# Patient Record
Sex: FEMALE | Race: WHITE | NOT HISPANIC OR LATINO | Employment: FULL TIME | URBAN - NONMETROPOLITAN AREA
[De-identification: names, ages, dates, MRNs, and addresses within clinical notes are randomized per-mention and may not be internally consistent; named-entity substitution may affect disease eponyms.]

---

## 2020-12-03 ENCOUNTER — HOSPITAL ENCOUNTER (OUTPATIENT)
Dept: ULTRASOUND IMAGING | Facility: HOSPITAL | Age: 35
Discharge: HOME | End: 2020-12-03

## 2020-12-03 ENCOUNTER — HOSPITAL ENCOUNTER (OUTPATIENT)
Dept: MAMMOGRAPHY | Facility: HOSPITAL | Age: 35
Discharge: HOME | End: 2020-12-03

## 2020-12-03 DIAGNOSIS — N63.0 BREAST LUMP: ICD-10-CM

## 2020-12-03 DIAGNOSIS — N63.0 LUMP OR MASS IN BREAST: ICD-10-CM

## 2020-12-03 PROCEDURE — 77066 DX MAMMO INCL CAD BI: CPT | Performed by: RADIOLOGY

## 2020-12-03 PROCEDURE — G0279 TOMOSYNTHESIS, MAMMO: HCPCS

## 2020-12-03 PROCEDURE — 76642 ULTRASOUND BREAST LIMITED: CPT | Performed by: RADIOLOGY

## 2020-12-03 PROCEDURE — 76642 ULTRASOUND BREAST LIMITED: CPT

## 2020-12-03 PROCEDURE — 77062 BREAST TOMOSYNTHESIS BI: CPT | Performed by: RADIOLOGY

## 2020-12-03 PROCEDURE — 77066 DX MAMMO INCL CAD BI: CPT

## 2021-01-08 ENCOUNTER — OFFICE VISIT (OUTPATIENT)
Dept: SURGERY | Facility: CLINIC | Age: 36
End: 2021-01-08

## 2021-01-08 ENCOUNTER — LAB (OUTPATIENT)
Dept: LAB | Facility: HOSPITAL | Age: 36
End: 2021-01-08

## 2021-01-08 ENCOUNTER — ANESTHESIA EVENT (OUTPATIENT)
Dept: PERIOP | Facility: HOSPITAL | Age: 36
End: 2021-01-08

## 2021-01-08 ENCOUNTER — HOSPITAL ENCOUNTER (OUTPATIENT)
Facility: HOSPITAL | Age: 36
Setting detail: HOSPITAL OUTPATIENT SURGERY
Discharge: HOME | End: 2021-01-08
Attending: SURGERY | Admitting: SURGERY

## 2021-01-08 ENCOUNTER — ANESTHESIA (OUTPATIENT)
Dept: PERIOP | Facility: HOSPITAL | Age: 36
End: 2021-01-08

## 2021-01-08 ENCOUNTER — PREP FOR SURGERY (OUTPATIENT)
Dept: OTHER | Facility: HOSPITAL | Age: 36
End: 2021-01-08

## 2021-01-08 VITALS
WEIGHT: 269 LBS | RESPIRATION RATE: 18 BRPM | TEMPERATURE: 97.7 F | BODY MASS INDEX: 36.44 KG/M2 | HEART RATE: 72 BPM | DIASTOLIC BLOOD PRESSURE: 79 MMHG | HEIGHT: 72 IN | OXYGEN SATURATION: 95 % | SYSTOLIC BLOOD PRESSURE: 125 MMHG

## 2021-01-08 VITALS
HEIGHT: 72 IN | BODY MASS INDEX: 37.6 KG/M2 | HEART RATE: 87 BPM | SYSTOLIC BLOOD PRESSURE: 139 MMHG | WEIGHT: 277.6 LBS | DIASTOLIC BLOOD PRESSURE: 90 MMHG

## 2021-01-08 DIAGNOSIS — Z01.818 PRE-OP TESTING: ICD-10-CM

## 2021-01-08 DIAGNOSIS — N61.1 BREAST ABSCESS: Primary | ICD-10-CM

## 2021-01-08 DIAGNOSIS — Z01.818 PRE-OP TESTING: Primary | ICD-10-CM

## 2021-01-08 DIAGNOSIS — N61.1 BREAST ABSCESS: ICD-10-CM

## 2021-01-08 LAB
B-HCG UR QL: NEGATIVE
GLUCOSE BLDC GLUCOMTR-MCNC: 125 MG/DL (ref 70–130)
INTERNAL NEGATIVE CONTROL: NEGATIVE
INTERNAL POSITIVE CONTROL: POSITIVE
Lab: NORMAL
SARS-COV-2 RDRP RESP QL NAA+PROBE: NORMAL

## 2021-01-08 PROCEDURE — 82962 GLUCOSE BLOOD TEST: CPT

## 2021-01-08 PROCEDURE — 25010000002 KETOROLAC TROMETHAMINE PER 15 MG: Performed by: NURSE ANESTHETIST, CERTIFIED REGISTERED

## 2021-01-08 PROCEDURE — 87070 CULTURE OTHR SPECIMN AEROBIC: CPT | Performed by: SURGERY

## 2021-01-08 PROCEDURE — 25010000003 MEPERIDINE PER 100 MG: Performed by: NURSE ANESTHETIST, CERTIFIED REGISTERED

## 2021-01-08 PROCEDURE — 10060 I&D ABSCESS SIMPLE/SINGLE: CPT | Performed by: SURGERY

## 2021-01-08 PROCEDURE — 25010000002 FENTANYL CITRATE (PF) 100 MCG/2ML SOLUTION: Performed by: NURSE ANESTHETIST, CERTIFIED REGISTERED

## 2021-01-08 PROCEDURE — 25010000002 PROPOFOL 10 MG/ML EMULSION: Performed by: NURSE ANESTHETIST, CERTIFIED REGISTERED

## 2021-01-08 PROCEDURE — 25010000002 MIDAZOLAM PER 1 MG: Performed by: NURSE ANESTHETIST, CERTIFIED REGISTERED

## 2021-01-08 PROCEDURE — 87635 SARS-COV-2 COVID-19 AMP PRB: CPT | Performed by: SURGERY

## 2021-01-08 PROCEDURE — 87205 SMEAR GRAM STAIN: CPT | Performed by: SURGERY

## 2021-01-08 PROCEDURE — 81025 URINE PREGNANCY TEST: CPT | Performed by: ANESTHESIOLOGY

## 2021-01-08 PROCEDURE — 87186 SC STD MICRODIL/AGAR DIL: CPT | Performed by: SURGERY

## 2021-01-08 PROCEDURE — 99243 OFF/OP CNSLTJ NEW/EST LOW 30: CPT | Performed by: SURGERY

## 2021-01-08 PROCEDURE — 25010000002 ONDANSETRON PER 1 MG: Performed by: NURSE ANESTHETIST, CERTIFIED REGISTERED

## 2021-01-08 RX ORDER — SODIUM CHLORIDE 0.9 % (FLUSH) 0.9 %
10 SYRINGE (ML) INJECTION EVERY 12 HOURS SCHEDULED
Status: DISCONTINUED | OUTPATIENT
Start: 2021-01-08 | End: 2021-01-08 | Stop reason: HOSPADM

## 2021-01-08 RX ORDER — MEPERIDINE HYDROCHLORIDE 25 MG/ML
12.5 INJECTION INTRAMUSCULAR; INTRAVENOUS; SUBCUTANEOUS
Status: COMPLETED | OUTPATIENT
Start: 2021-01-08 | End: 2021-01-08

## 2021-01-08 RX ORDER — FENTANYL CITRATE 50 UG/ML
50 INJECTION, SOLUTION INTRAMUSCULAR; INTRAVENOUS
Status: DISCONTINUED | OUTPATIENT
Start: 2021-01-08 | End: 2021-01-08 | Stop reason: HOSPADM

## 2021-01-08 RX ORDER — GLIMEPIRIDE 1 MG/1
TABLET ORAL
COMMUNITY
Start: 2020-10-30 | End: 2021-08-06

## 2021-01-08 RX ORDER — SODIUM CHLORIDE 0.9 % (FLUSH) 0.9 %
10 SYRINGE (ML) INJECTION AS NEEDED
Status: DISCONTINUED | OUTPATIENT
Start: 2021-01-08 | End: 2021-01-08 | Stop reason: HOSPADM

## 2021-01-08 RX ORDER — MULTIVIT,CALC,MINS/IRON/FOLIC 500-18-0.4
TABLET ORAL DAILY
COMMUNITY
Start: 2013-02-19

## 2021-01-08 RX ORDER — ONDANSETRON 2 MG/ML
INJECTION INTRAMUSCULAR; INTRAVENOUS AS NEEDED
Status: DISCONTINUED | OUTPATIENT
Start: 2021-01-08 | End: 2021-01-08 | Stop reason: SURG

## 2021-01-08 RX ORDER — MIDAZOLAM HYDROCHLORIDE 1 MG/ML
1 INJECTION INTRAMUSCULAR; INTRAVENOUS
Status: DISCONTINUED | OUTPATIENT
Start: 2021-01-08 | End: 2021-01-08 | Stop reason: HOSPADM

## 2021-01-08 RX ORDER — CLINDAMYCIN PHOSPHATE 900 MG/50ML
900 INJECTION INTRAVENOUS ONCE
Status: CANCELLED | OUTPATIENT
Start: 2021-01-14 | End: 2021-01-08

## 2021-01-08 RX ORDER — MIDAZOLAM HYDROCHLORIDE 1 MG/ML
INJECTION INTRAMUSCULAR; INTRAVENOUS AS NEEDED
Status: DISCONTINUED | OUTPATIENT
Start: 2021-01-08 | End: 2021-01-08 | Stop reason: SURG

## 2021-01-08 RX ORDER — ONDANSETRON 2 MG/ML
4 INJECTION INTRAMUSCULAR; INTRAVENOUS AS NEEDED
Status: DISCONTINUED | OUTPATIENT
Start: 2021-01-08 | End: 2021-01-08 | Stop reason: HOSPADM

## 2021-01-08 RX ORDER — SODIUM CHLORIDE, SODIUM LACTATE, POTASSIUM CHLORIDE, CALCIUM CHLORIDE 600; 310; 30; 20 MG/100ML; MG/100ML; MG/100ML; MG/100ML
100 INJECTION, SOLUTION INTRAVENOUS ONCE AS NEEDED
Status: DISCONTINUED | OUTPATIENT
Start: 2021-01-08 | End: 2021-01-08 | Stop reason: HOSPADM

## 2021-01-08 RX ORDER — FENTANYL CITRATE 50 UG/ML
INJECTION, SOLUTION INTRAMUSCULAR; INTRAVENOUS AS NEEDED
Status: DISCONTINUED | OUTPATIENT
Start: 2021-01-08 | End: 2021-01-08 | Stop reason: SURG

## 2021-01-08 RX ORDER — DROPERIDOL 2.5 MG/ML
0.62 INJECTION, SOLUTION INTRAMUSCULAR; INTRAVENOUS ONCE AS NEEDED
Status: DISCONTINUED | OUTPATIENT
Start: 2021-01-08 | End: 2021-01-08 | Stop reason: HOSPADM

## 2021-01-08 RX ORDER — KETOROLAC TROMETHAMINE 30 MG/ML
INJECTION, SOLUTION INTRAMUSCULAR; INTRAVENOUS AS NEEDED
Status: DISCONTINUED | OUTPATIENT
Start: 2021-01-08 | End: 2021-01-08 | Stop reason: SURG

## 2021-01-08 RX ORDER — SITAGLIPTIN AND METFORMIN HYDROCHLORIDE 1000; 100 MG/1; MG/1
1 TABLET, FILM COATED, EXTENDED RELEASE ORAL DAILY
COMMUNITY
End: 2021-08-06

## 2021-01-08 RX ORDER — SODIUM CHLORIDE, SODIUM LACTATE, POTASSIUM CHLORIDE, CALCIUM CHLORIDE 600; 310; 30; 20 MG/100ML; MG/100ML; MG/100ML; MG/100ML
125 INJECTION, SOLUTION INTRAVENOUS ONCE
Status: COMPLETED | OUTPATIENT
Start: 2021-01-08 | End: 2021-01-08

## 2021-01-08 RX ORDER — FAMOTIDINE 10 MG/ML
INJECTION, SOLUTION INTRAVENOUS AS NEEDED
Status: DISCONTINUED | OUTPATIENT
Start: 2021-01-08 | End: 2021-01-08 | Stop reason: SURG

## 2021-01-08 RX ORDER — OXYCODONE HYDROCHLORIDE AND ACETAMINOPHEN 5; 325 MG/1; MG/1
1 TABLET ORAL ONCE AS NEEDED
Status: DISCONTINUED | OUTPATIENT
Start: 2021-01-08 | End: 2021-01-08 | Stop reason: HOSPADM

## 2021-01-08 RX ORDER — MAGNESIUM HYDROXIDE 1200 MG/15ML
LIQUID ORAL AS NEEDED
Status: DISCONTINUED | OUTPATIENT
Start: 2021-01-08 | End: 2021-01-08 | Stop reason: HOSPADM

## 2021-01-08 RX ORDER — HYDROCODONE BITARTRATE AND ACETAMINOPHEN 7.5; 325 MG/1; MG/1
1 TABLET ORAL 4 TIMES DAILY PRN
Qty: 8 TABLET | Refills: 0 | Status: ON HOLD | OUTPATIENT
Start: 2021-01-08 | End: 2021-02-17 | Stop reason: SDUPTHER

## 2021-01-08 RX ORDER — PROPOFOL 10 MG/ML
VIAL (ML) INTRAVENOUS AS NEEDED
Status: DISCONTINUED | OUTPATIENT
Start: 2021-01-08 | End: 2021-01-08 | Stop reason: SURG

## 2021-01-08 RX ORDER — LIDOCAINE HYDROCHLORIDE 20 MG/ML
INJECTION, SOLUTION INFILTRATION; PERINEURAL AS NEEDED
Status: DISCONTINUED | OUTPATIENT
Start: 2021-01-08 | End: 2021-01-08 | Stop reason: SURG

## 2021-01-08 RX ORDER — IPRATROPIUM BROMIDE AND ALBUTEROL SULFATE 2.5; .5 MG/3ML; MG/3ML
3 SOLUTION RESPIRATORY (INHALATION) ONCE AS NEEDED
Status: DISCONTINUED | OUTPATIENT
Start: 2021-01-08 | End: 2021-01-08 | Stop reason: HOSPADM

## 2021-01-08 RX ORDER — DOXYCYCLINE HYCLATE 100 MG/1
100 CAPSULE ORAL 2 TIMES DAILY
Qty: 20 CAPSULE | Refills: 0 | Status: SHIPPED | OUTPATIENT
Start: 2021-01-08 | End: 2021-01-18

## 2021-01-08 RX ORDER — LORATADINE 10 MG/1
TABLET ORAL
COMMUNITY
Start: 2013-05-30

## 2021-01-08 RX ADMIN — MIDAZOLAM HYDROCHLORIDE 2 MG: 1 INJECTION, SOLUTION INTRAMUSCULAR; INTRAVENOUS at 13:08

## 2021-01-08 RX ADMIN — FENTANYL CITRATE 50 MCG: 50 INJECTION INTRAMUSCULAR; INTRAVENOUS at 13:11

## 2021-01-08 RX ADMIN — SODIUM CHLORIDE, POTASSIUM CHLORIDE, SODIUM LACTATE AND CALCIUM CHLORIDE: 600; 310; 30; 20 INJECTION, SOLUTION INTRAVENOUS at 13:02

## 2021-01-08 RX ADMIN — MEPERIDINE HYDROCHLORIDE 12.5 MG: 25 INJECTION INTRAMUSCULAR; INTRAVENOUS; SUBCUTANEOUS at 14:05

## 2021-01-08 RX ADMIN — PROPOFOL 150 MG: 10 INJECTION, EMULSION INTRAVENOUS at 13:11

## 2021-01-08 RX ADMIN — FENTANYL CITRATE 50 MCG: 50 INJECTION INTRAMUSCULAR; INTRAVENOUS at 13:58

## 2021-01-08 RX ADMIN — FENTANYL CITRATE 50 MCG: 50 INJECTION INTRAMUSCULAR; INTRAVENOUS at 13:53

## 2021-01-08 RX ADMIN — VANCOMYCIN HYDROCHLORIDE 1 G: 1 INJECTION, POWDER, LYOPHILIZED, FOR SOLUTION INTRAVENOUS at 13:18

## 2021-01-08 RX ADMIN — SODIUM CHLORIDE, POTASSIUM CHLORIDE, SODIUM LACTATE AND CALCIUM CHLORIDE: 600; 310; 30; 20 INJECTION, SOLUTION INTRAVENOUS at 13:24

## 2021-01-08 RX ADMIN — LIDOCAINE HYDROCHLORIDE 60 MG: 20 INJECTION, SOLUTION INFILTRATION; PERINEURAL at 13:11

## 2021-01-08 RX ADMIN — KETOROLAC TROMETHAMINE 30 MG: 30 INJECTION, SOLUTION INTRAMUSCULAR; INTRAVENOUS at 13:27

## 2021-01-08 RX ADMIN — MEPERIDINE HYDROCHLORIDE 12.5 MG: 25 INJECTION INTRAMUSCULAR; INTRAVENOUS; SUBCUTANEOUS at 13:56

## 2021-01-08 RX ADMIN — FAMOTIDINE 20 MG: 10 INJECTION INTRAVENOUS at 13:08

## 2021-01-08 RX ADMIN — ONDANSETRON 4 MG: 2 INJECTION INTRAMUSCULAR; INTRAVENOUS at 13:22

## 2021-01-08 RX ADMIN — FENTANYL CITRATE 50 MCG: 50 INJECTION INTRAMUSCULAR; INTRAVENOUS at 13:22

## 2021-01-08 NOTE — ANESTHESIA PREPROCEDURE EVALUATION
Anesthesia Evaluation     Patient summary reviewed and Nursing notes reviewed   no history of anesthetic complications:               Airway   Mallampati: II  TM distance: >3 FB  Neck ROM: full  No difficulty expected  Dental - normal exam     Pulmonary - negative pulmonary ROS and normal exam   Cardiovascular - negative cardio ROS and normal exam  Exercise tolerance: good (4-7 METS)    NYHA Classification: II        Neuro/Psych  (+) seizures well controlled,       ROS Comment: Siezures as child  GI/Hepatic/Renal/Endo    (+)   diabetes mellitus,     Musculoskeletal     Abdominal  - normal exam    Bowel sounds: normal.   Substance History - negative use     OB/GYN negative ob/gyn ROS         Other   arthritis,                      Anesthesia Plan    ASA 2     general     intravenous induction     Anesthetic plan, all risks, benefits, and alternatives have been provided, discussed and informed consent has been obtained with: patient.

## 2021-01-08 NOTE — PROGRESS NOTES
"Subjective   Marybeth Drake is a 35 y.o. female is being seen for consultation today at the request of Dr. Lopez for breat abscess.    History of Present Illness  Ms. Drake was seen in the office today for right breast abscess.  She states that this started back in November and was initially treated with antibiotics.  It subsequently flared up again and she saw Dr. Lopez who did an office drainage and left a drain in for about 2 weeks it was then removed.  Patient was treated with both Bactrim and doxycycline.  She does not know if a culture was done.  Just recently the abscess recurred and is extremely tender and painful.  The patient did have a bilateral diagnostic mammogram with tomosynthesis and right breast ultrasound on 12/3/2020 which did not demonstrate any undrained abscess and did not demonstrate malignancy.    Allergies   Allergen Reactions   • Phenobarbital Hallucinations       Current Outpatient Medications   Medication Sig Dispense Refill   • loratadine (CLARITIN) 10 MG tablet Take  by mouth.     • metFORMIN (GLUCOPHAGE) 850 MG tablet Take  by mouth.     • Multiple Vitamins-Calcium (One-A-Day Womens Formula) tablet Take  by mouth Daily.     • SITagliptin-metFORMIN HCl ER (Janumet XR) 100-1000 MG tablet Take 1 tablet by mouth Daily.     • dicloxacillin (DYNAPEN) 500 MG capsule TAKE 1 CAPSULE BY MOUTH EVERY 6 HOURS     • glimepiride (AMARYL) 1 MG tablet        No current facility-administered medications for this visit.      Past Medical History:   Diagnosis Date   • Arthritis    • Diabetes mellitus (CMS/HCC)    • Seizures (CMS/HCC)      Past Surgical History:   Procedure Laterality Date   • ARM HARDWARE REMOVAL     • SHOULDER SURGERY         Pertinent Review of Systems  Negative with the exception of history of present illness    Objective   /90 (BP Location: Left arm)   Pulse 87   Ht 185.4 cm (73\")   Wt 126 kg (277 lb 9.6 oz)   BMI 36.62 kg/m²    Physical Exam  Neck:  No supraclavicular " adenopathy  Lungs:  Respiratory effort normal. Auscultation: Clear, without wheezes, rhonchi, rales  Heart:  Regular rate and rhythm, without murmur, gallop, rub.  No pedal edema  Breasts: On visual inspection there is central erythema of the right breast with an obvious abscess just medial to the edge of the nipple areolar complex.  Examination of the right breast is limited due to the tenderness but the entire central area is indurated.  There is no axillary adenopathy.  Examination of the left breast demonstrates no discrete mass, skin change, or axillary adenopathy.       Procedures     Results/Data:  Imaging: Mammogram and ultrasound reports and images were reviewed and I agree with the assessment  Notes:   Lab:  Other:     Assessment/Plan   Recurrent right breast abscess    Plan: Patient will go to the OR today for surgical drainage       Discussion/Summary:    Time spent:     Patient's Body mass index is 36.62 kg/m². BMI is above normal parameters. Recommendations include: educational material.         Future Appointments   Date Time Provider Department Center   3/4/2021 10:00 AM [facility]         Please note that portions of this note were completed with a voice recognition program.

## 2021-01-08 NOTE — ANESTHESIA POSTPROCEDURE EVALUATION
Patient: Marybeth Drake    Procedure Summary     Date: 01/08/21 Room / Location:  [facility] OR 02 /  [facility]    Anesthesia Start: 1308 Anesthesia Stop: 1342    Procedure: INCISION AND DRAINAGE ABSCESS right breast (N/A Abdomen) Diagnosis:       Breast abscess      (Breast abscess [N61.1])    Surgeon: Mana Clark MD Provider: Fco Restrepo DO    Anesthesia Type: general ASA Status: 2          Anesthesia Type: general    Vitals  Vitals Value Taken Time   /80 01/08/21 1411   Temp 98.1 °F (36.7 °C) 01/08/21 1343   Pulse 83 01/08/21 1411   Resp 15 01/08/21 1411   SpO2 95 % 01/08/21 1411           Post Anesthesia Care and Evaluation    Patient location during evaluation: PHASE II  Patient participation: complete - patient participated  Level of consciousness: awake and alert  Pain score: 1  Pain management: adequate  Airway patency: patent  Anesthetic complications: No anesthetic complications  PONV Status: controlled  Cardiovascular status: acceptable  Respiratory status: acceptable  Hydration status: acceptable

## 2021-01-08 NOTE — H&P (VIEW-ONLY)
"Subjective   Marybeth Drake is a 35 y.o. female is being seen for consultation today at the request of Dr. Lopez for breat abscess.    History of Present Illness  Ms. Drake was seen in the office today for right breast abscess.  She states that this started back in November and was initially treated with antibiotics.  It subsequently flared up again and she saw Dr. Lopez who did an office drainage and left a drain in for about 2 weeks it was then removed.  Patient was treated with both Bactrim and doxycycline.  She does not know if a culture was done.  Just recently the abscess recurred and is extremely tender and painful.  The patient did have a bilateral diagnostic mammogram with tomosynthesis and right breast ultrasound on 12/3/2020 which did not demonstrate any undrained abscess and did not demonstrate malignancy.    Allergies   Allergen Reactions   • Phenobarbital Hallucinations       Current Outpatient Medications   Medication Sig Dispense Refill   • loratadine (CLARITIN) 10 MG tablet Take  by mouth.     • metFORMIN (GLUCOPHAGE) 850 MG tablet Take  by mouth.     • Multiple Vitamins-Calcium (One-A-Day Womens Formula) tablet Take  by mouth Daily.     • SITagliptin-metFORMIN HCl ER (Janumet XR) 100-1000 MG tablet Take 1 tablet by mouth Daily.     • dicloxacillin (DYNAPEN) 500 MG capsule TAKE 1 CAPSULE BY MOUTH EVERY 6 HOURS     • glimepiride (AMARYL) 1 MG tablet        No current facility-administered medications for this visit.      Past Medical History:   Diagnosis Date   • Arthritis    • Diabetes mellitus (CMS/HCC)    • Seizures (CMS/HCC)      Past Surgical History:   Procedure Laterality Date   • ARM HARDWARE REMOVAL     • SHOULDER SURGERY         Pertinent Review of Systems  Negative with the exception of history of present illness    Objective   /90 (BP Location: Left arm)   Pulse 87   Ht 185.4 cm (73\")   Wt 126 kg (277 lb 9.6 oz)   BMI 36.62 kg/m²    Physical Exam  Neck:  No supraclavicular " adenopathy  Lungs:  Respiratory effort normal. Auscultation: Clear, without wheezes, rhonchi, rales  Heart:  Regular rate and rhythm, without murmur, gallop, rub.  No pedal edema  Breasts: On visual inspection there is central erythema of the right breast with an obvious abscess just medial to the edge of the nipple areolar complex.  Examination of the right breast is limited due to the tenderness but the entire central area is indurated.  There is no axillary adenopathy.  Examination of the left breast demonstrates no discrete mass, skin change, or axillary adenopathy.       Procedures     Results/Data:  Imaging: Mammogram and ultrasound reports and images were reviewed and I agree with the assessment  Notes:   Lab:  Other:     Assessment/Plan   Recurrent right breast abscess    Plan: Patient will go to the OR today for surgical drainage       Discussion/Summary:    Time spent:     Patient's Body mass index is 36.62 kg/m². BMI is above normal parameters. Recommendations include: educational material.         Future Appointments   Date Time Provider Department Center   3/4/2021 10:00 AM [facility]         Please note that portions of this note were completed with a voice recognition program.  This document has been electronically signed by Mana CONNELL MD on January 8, 2021 11:30 EST

## 2021-01-08 NOTE — OP NOTE
Incision and Drainage  Right breast abscess    Surgeon:  Mana Clark M.D., ЕКАТЕРИНА    Assistant;  none    Pre-op:  Abscess right breast    Post-op:  Same    Anesthesia: Gen.    Indications: right breast abscess       Procedure Details   After obtaining informed consent and receiving preoperative antibiotics and with venous compression boots in place, the patient was taken to the operating room and placed under anesthesia.  Incision and drainage was performed and the wound was irrigated with peroxide.  Location:  Right medial breal  Findings:   5 cm abscess cavity  Culture:  done  Drain/Packing:  penrose      Estimated Blood Loss:  minimal    Blood administered:  None    Drains: see above    Grafts and Implants: None    Specimens:   ID Type Source Tests Collected by Time   1 (Not marked as sent) : right breast abscess  Wound Breast, Right WOUND CULTURE Mana Clark MD 1/8/2021 [id number]              Complications:  none           Disposition: PACU - hemodynamically stable.           Condition: stable

## 2021-01-08 NOTE — ANESTHESIA PROCEDURE NOTES
Airway  Urgency: elective    Date/Time: 1/8/2021 1:12 PM    General Information and Staff    Patient location during procedure: OR    Indications and Patient Condition    Preoxygenated: yes  Mask difficulty assessment: 0 - not attempted    Final Airway Details  Final airway type: supraglottic airway      Successful airway: unique  Size 4    Number of attempts at approach: 1  Assessment: lips, teeth, and gum same as pre-op

## 2021-01-11 LAB
BACTERIA SPEC AEROBE CULT: ABNORMAL
GRAM STN SPEC: ABNORMAL

## 2021-01-14 ENCOUNTER — OFFICE VISIT (OUTPATIENT)
Dept: SURGERY | Facility: CLINIC | Age: 36
End: 2021-01-14

## 2021-01-14 VITALS
HEART RATE: 90 BPM | BODY MASS INDEX: 36.44 KG/M2 | DIASTOLIC BLOOD PRESSURE: 85 MMHG | WEIGHT: 269 LBS | SYSTOLIC BLOOD PRESSURE: 134 MMHG | HEIGHT: 72 IN

## 2021-01-14 DIAGNOSIS — N61.1 BREAST ABSCESS: Primary | ICD-10-CM

## 2021-01-14 DIAGNOSIS — Z09 POSTOP CHECK: ICD-10-CM

## 2021-01-14 PROCEDURE — 99024 POSTOP FOLLOW-UP VISIT: CPT | Performed by: SURGERY

## 2021-01-14 NOTE — PROGRESS NOTES
"Subjective   Marybeth Drake is a 35 y.o. female is here today for post op.    History of Present Illness  Ms. Drake was seen in the office today for first postoperative visit following drainage of a right breast abscess.  Culture results grew light Streptococcus anginosis.  Patient is still on antibiotics.  Allergies   Allergen Reactions   • Phenobarbital Hallucinations         Current Outpatient Medications   Medication Sig Dispense Refill   • dicloxacillin (DYNAPEN) 500 MG capsule TAKE 1 CAPSULE BY MOUTH EVERY 6 HOURS     • doxycycline (VIBRAMYCIN) 100 MG capsule Take 1 capsule by mouth 2 (Two) Times a Day for 10 days. 20 capsule 0   • glimepiride (AMARYL) 1 MG tablet      • HYDROcodone-acetaminophen (Norco) 7.5-325 MG per tablet Take 1 tablet by mouth 4 (Four) Times a Day As Needed for Moderate Pain . 8 tablet 0   • loratadine (CLARITIN) 10 MG tablet Take  by mouth.     • metFORMIN (GLUCOPHAGE) 850 MG tablet Take  by mouth.     • Multiple Vitamins-Calcium (One-A-Day Womens Formula) tablet Take  by mouth Daily.     • SITagliptin-metFORMIN HCl ER (Janumet XR) 100-1000 MG tablet Take 1 tablet by mouth Daily.       No current facility-administered medications for this visit.        Objective   Ht 185.4 cm (73\")   Wt 122 kg (269 lb)   LMP 12/28/2020   BMI 35.49 kg/m²    Physical Exam  On examination of the right breast there is a Penrose drain in site.  Surgical incision is intact.  Induration is markedly improved.  Drain was removed but sutures were left in place  Results/Data  Culture results were reviewed    Procedures     Assessment/Plan   Right breast abscess growing Streptococcus    Follow-up in office Monday for suture removal       Discussion/Summary  Patient's Body mass index is 35.49 kg/m². BMI is above normal parameters. Recommendations include: educational material.       Future Appointments   Date Time Provider Department Center   3/4/2021 10:00 AM [facility]         Please note " that portions of this note were completed with a voice recognition program.

## 2021-01-18 ENCOUNTER — OFFICE VISIT (OUTPATIENT)
Dept: SURGERY | Facility: CLINIC | Age: 36
End: 2021-01-18

## 2021-01-18 VITALS
WEIGHT: 279 LBS | HEART RATE: 91 BPM | HEIGHT: 72 IN | BODY MASS INDEX: 37.79 KG/M2 | DIASTOLIC BLOOD PRESSURE: 88 MMHG | SYSTOLIC BLOOD PRESSURE: 121 MMHG

## 2021-01-18 DIAGNOSIS — N61.1 BREAST ABSCESS: Primary | ICD-10-CM

## 2021-01-18 DIAGNOSIS — Z51.89 VISIT FOR WOUND CHECK: ICD-10-CM

## 2021-01-18 PROCEDURE — 99024 POSTOP FOLLOW-UP VISIT: CPT | Performed by: SURGERY

## 2021-01-18 RX ORDER — CEFDINIR 300 MG/1
300 CAPSULE ORAL 2 TIMES DAILY
Qty: 14 CAPSULE | Refills: 0 | Status: SHIPPED | OUTPATIENT
Start: 2021-01-18 | End: 2021-01-25

## 2021-02-08 ENCOUNTER — OFFICE VISIT (OUTPATIENT)
Dept: SURGERY | Facility: CLINIC | Age: 36
End: 2021-02-08

## 2021-02-08 VITALS — BODY MASS INDEX: 38.14 KG/M2 | HEIGHT: 72 IN | WEIGHT: 281.6 LBS

## 2021-02-08 DIAGNOSIS — N61.1 BREAST ABSCESS: Primary | ICD-10-CM

## 2021-02-08 PROCEDURE — 87077 CULTURE AEROBIC IDENTIFY: CPT | Performed by: SURGERY

## 2021-02-08 PROCEDURE — 87070 CULTURE OTHR SPECIMN AEROBIC: CPT | Performed by: SURGERY

## 2021-02-08 PROCEDURE — 87205 SMEAR GRAM STAIN: CPT | Performed by: SURGERY

## 2021-02-08 PROCEDURE — 87186 SC STD MICRODIL/AGAR DIL: CPT | Performed by: SURGERY

## 2021-02-08 PROCEDURE — 99212 OFFICE O/P EST SF 10 MIN: CPT | Performed by: SURGERY

## 2021-02-08 RX ORDER — PENICILLIN V POTASSIUM 500 MG/1
500 TABLET ORAL 3 TIMES DAILY
Qty: 30 TABLET | Refills: 0 | Status: SHIPPED | OUTPATIENT
Start: 2021-02-08 | End: 2021-02-18

## 2021-02-08 NOTE — PROGRESS NOTES
Subjective   Marybeth Drake is a 35 y.o. female here today for wound check.    History of Present Illness  Ms. Drake was seen in the office today for recurrent right breast abscess.  She was initially seen in early January for recurrent abscess from December.  She underwent incision and drainage on 1/8/2021 which grew Streptococcus anginosus.  The patient healed up well from that but called the office earlier today stating that she felt that the abscess had recurred.  Patient also shared that she got her nipples repierced and she is having some drainage from the lateral aspect of the right nipple.  Allergies   Allergen Reactions   • Phenobarbital Hallucinations       Current Outpatient Medications   Medication Sig Dispense Refill   • glimepiride (AMARYL) 1 MG tablet      • loratadine (CLARITIN) 10 MG tablet Take  by mouth.     • metFORMIN (GLUCOPHAGE) 850 MG tablet Take  by mouth.     • Multiple Vitamins-Calcium (One-A-Day Womens Formula) tablet Take  by mouth Daily.     • SITagliptin-metFORMIN HCl ER (Janumet XR) 100-1000 MG tablet Take 1 tablet by mouth Daily.     • dicloxacillin (DYNAPEN) 500 MG capsule TAKE 1 CAPSULE BY MOUTH EVERY 6 HOURS     • HYDROcodone-acetaminophen (Norco) 7.5-325 MG per tablet Take 1 tablet by mouth 4 (Four) Times a Day As Needed for Moderate Pain . 8 tablet 0     No current facility-administered medications for this visit.      Past Medical History:   Diagnosis Date   • Abscess     right breast   • Arthritis    • Diabetes mellitus (CMS/HCC)    • Missed ab    • Seizures (CMS/HCC)      Past Surgical History:   Procedure Laterality Date   • ARM HARDWARE REMOVAL Right    • D&C WITH SUCTION     • INCISION AND DRAINAGE ABSCESS N/A 1/8/2021    Procedure: INCISION AND DRAINAGE ABSCESS right breast;  Surgeon: Mana Clark MD;  Location: [facility];  Service: General;  Laterality: N/A;   • SHOULDER SURGERY Right        Pertinent Review of Systems  Negative except for history of present  "illness    Objective   Ht 185.4 cm (73\")   Wt 128 kg (281 lb 9.6 oz)   BMI 37.15 kg/m²    Physical Exam  General:  This is a WD WN female in no acute distress  Lungs:  Respiratory effort normal. Auscultation: Clear, without wheezes, rhonchi, rales  Heart:  Regular rate and rhythm, without murmur, gallop, rub.  No pedal edema  Right breast: There is a transverse scar involving the 3:00 areola.  There is a single drop of pus that can be expressed from this.  On the lateral side of the nipple where the patient had the nipple pierced a drop of milky discharge could be elicited but this is not the same as what is coming out from the abscess site.  Culture was performed  Procedures     Results/Data:  Imaging:   Notes:   Lab: Prior culture results from the January drainage of abscess were reviewed    Other:     Assessment/Plan   Recurrent right breast abscess    Will empirically place patient on penicillin missed on prior culture results of strep  Tract culture result  Reexcision of abscess cavity in 1 week       Discussion/Summary:    Time spent:     Patient's Body mass index is 37.15 kg/m². BMI is above normal parameters. Recommendations include: educational material.         Future Appointments   Date Time Provider Department Center   3/4/2021 10:00 AM [facility]         Please note that portions of this note were completed with a voice recognition program.  "

## 2021-02-09 DIAGNOSIS — Z01.818 PRE-OP TESTING: Primary | ICD-10-CM

## 2021-02-10 NOTE — H&P (VIEW-ONLY)
Subjective   Marybeth Drake is a 35 y.o. female here today for wound check.    History of Present Illness  Ms. Drake was seen in the office today for recurrent right breast abscess.  She was initially seen in early January for recurrent abscess from December.  She underwent incision and drainage on 1/8/2021 which grew Streptococcus anginosus.  The patient healed up well from that but called the office earlier today stating that she felt that the abscess had recurred.  Patient also shared that she got her nipples repierced and she is having some drainage from the lateral aspect of the right nipple.  Allergies   Allergen Reactions   • Phenobarbital Hallucinations       Current Outpatient Medications   Medication Sig Dispense Refill   • glimepiride (AMARYL) 1 MG tablet      • loratadine (CLARITIN) 10 MG tablet Take  by mouth.     • metFORMIN (GLUCOPHAGE) 850 MG tablet Take  by mouth.     • Multiple Vitamins-Calcium (One-A-Day Womens Formula) tablet Take  by mouth Daily.     • SITagliptin-metFORMIN HCl ER (Janumet XR) 100-1000 MG tablet Take 1 tablet by mouth Daily.     • dicloxacillin (DYNAPEN) 500 MG capsule TAKE 1 CAPSULE BY MOUTH EVERY 6 HOURS     • HYDROcodone-acetaminophen (Norco) 7.5-325 MG per tablet Take 1 tablet by mouth 4 (Four) Times a Day As Needed for Moderate Pain . 8 tablet 0     No current facility-administered medications for this visit.      Past Medical History:   Diagnosis Date   • Abscess     right breast   • Arthritis    • Diabetes mellitus (CMS/HCC)    • Missed ab    • Seizures (CMS/HCC)      Past Surgical History:   Procedure Laterality Date   • ARM HARDWARE REMOVAL Right    • D&C WITH SUCTION     • INCISION AND DRAINAGE ABSCESS N/A 1/8/2021    Procedure: INCISION AND DRAINAGE ABSCESS right breast;  Surgeon: Mana Clark MD;  Location: [facility];  Service: General;  Laterality: N/A;   • SHOULDER SURGERY Right        Pertinent Review of Systems  Negative except for history of present  "illness    Objective   Ht 185.4 cm (73\")   Wt 128 kg (281 lb 9.6 oz)   BMI 37.15 kg/m²    Physical Exam  General:  This is a WD WN female in no acute distress  Lungs:  Respiratory effort normal. Auscultation: Clear, without wheezes, rhonchi, rales  Heart:  Regular rate and rhythm, without murmur, gallop, rub.  No pedal edema  Right breast: There is a transverse scar involving the 3:00 areola.  There is a single drop of pus that can be expressed from this.  On the lateral side of the nipple where the patient had the nipple pierced a drop of milky discharge could be elicited but this is not the same as what is coming out from the abscess site.  Culture was performed  Procedures     Results/Data:  Imaging:   Notes:   Lab: Prior culture results from the January drainage of abscess were reviewed    Other:     Assessment/Plan   Recurrent right breast abscess    Will empirically place patient on penicillin missed on prior culture results of strep  Tract culture result  Reexcision of abscess cavity in 1 week       Discussion/Summary:    Time spent:     Patient's Body mass index is 37.15 kg/m². BMI is above normal parameters. Recommendations include: educational material.         Future Appointments   Date Time Provider Department Center   3/4/2021 10:00 AM [facility]         Please note that portions of this note were completed with a voice recognition program.  This document has been electronically signed by Mana CONNELL MD on February 10, 2021 14:11 EST  "

## 2021-02-10 NOTE — H&P
Subjective   Marybeth Drake is a 35 y.o. female here today for wound check.    History of Present Illness  Ms. Drake was seen in the office today for recurrent right breast abscess.  She was initially seen in early January for recurrent abscess from December.  She underwent incision and drainage on 1/8/2021 which grew Streptococcus anginosus.  The patient healed up well from that but called the office earlier today stating that she felt that the abscess had recurred.  Patient also shared that she got her nipples repierced and she is having some drainage from the lateral aspect of the right nipple.  Allergies   Allergen Reactions   • Phenobarbital Hallucinations       Current Outpatient Medications   Medication Sig Dispense Refill   • glimepiride (AMARYL) 1 MG tablet      • loratadine (CLARITIN) 10 MG tablet Take  by mouth.     • metFORMIN (GLUCOPHAGE) 850 MG tablet Take  by mouth.     • Multiple Vitamins-Calcium (One-A-Day Womens Formula) tablet Take  by mouth Daily.     • SITagliptin-metFORMIN HCl ER (Janumet XR) 100-1000 MG tablet Take 1 tablet by mouth Daily.     • dicloxacillin (DYNAPEN) 500 MG capsule TAKE 1 CAPSULE BY MOUTH EVERY 6 HOURS     • HYDROcodone-acetaminophen (Norco) 7.5-325 MG per tablet Take 1 tablet by mouth 4 (Four) Times a Day As Needed for Moderate Pain . 8 tablet 0     No current facility-administered medications for this visit.      Past Medical History:   Diagnosis Date   • Abscess     right breast   • Arthritis    • Diabetes mellitus (CMS/HCC)    • Missed ab    • Seizures (CMS/HCC)      Past Surgical History:   Procedure Laterality Date   • ARM HARDWARE REMOVAL Right    • D&C WITH SUCTION     • INCISION AND DRAINAGE ABSCESS N/A 1/8/2021    Procedure: INCISION AND DRAINAGE ABSCESS right breast;  Surgeon: Mana Clark MD;  Location: [facility];  Service: General;  Laterality: N/A;   • SHOULDER SURGERY Right        Pertinent Review of Systems  Negative except for history of present  "illness    Objective   Ht 185.4 cm (73\")   Wt 128 kg (281 lb 9.6 oz)   BMI 37.15 kg/m²    Physical Exam  General:  This is a WD WN female in no acute distress  Lungs:  Respiratory effort normal. Auscultation: Clear, without wheezes, rhonchi, rales  Heart:  Regular rate and rhythm, without murmur, gallop, rub.  No pedal edema  Right breast: There is a transverse scar involving the 3:00 areola.  There is a single drop of pus that can be expressed from this.  On the lateral side of the nipple where the patient had the nipple pierced a drop of milky discharge could be elicited but this is not the same as what is coming out from the abscess site.  Culture was performed  Procedures     Results/Data:  Imaging:   Notes:   Lab: Prior culture results from the January drainage of abscess were reviewed    Other:     Assessment/Plan   Recurrent right breast abscess    Will empirically place patient on penicillin missed on prior culture results of strep  Tract culture result  Reexcision of abscess cavity in 1 week       Discussion/Summary:    Time spent:     Patient's Body mass index is 37.15 kg/m². BMI is above normal parameters. Recommendations include: educational material.         Future Appointments   Date Time Provider Department Center   3/4/2021 10:00 AM [facility]         Please note that portions of this note were completed with a voice recognition program.  This document has been electronically signed by Mana CONNELL MD on February 10, 2021 14:11 EST  "

## 2021-02-11 ENCOUNTER — TELEPHONE (OUTPATIENT)
Dept: SURGERY | Facility: CLINIC | Age: 36
End: 2021-02-11

## 2021-02-11 LAB
BACTERIA SPEC AEROBE CULT: ABNORMAL
GRAM STN SPEC: ABNORMAL

## 2021-02-12 ENCOUNTER — APPOINTMENT (OUTPATIENT)
Dept: PREADMISSION TESTING | Facility: HOSPITAL | Age: 36
End: 2021-02-12

## 2021-02-12 ENCOUNTER — LAB (OUTPATIENT)
Dept: LAB | Facility: HOSPITAL | Age: 36
End: 2021-02-12

## 2021-02-12 DIAGNOSIS — N61.1 BREAST ABSCESS: ICD-10-CM

## 2021-02-12 DIAGNOSIS — Z01.818 PRE-OP TESTING: ICD-10-CM

## 2021-02-12 LAB
ANION GAP SERPL CALCULATED.3IONS-SCNC: 12.1 MMOL/L (ref 5–15)
BASOPHILS # BLD AUTO: 0.1 10*3/MM3 (ref 0–0.2)
BASOPHILS NFR BLD AUTO: 0.8 % (ref 0–1.5)
BUN SERPL-MCNC: 10 MG/DL (ref 6–20)
BUN/CREAT SERPL: 13.5 (ref 7–25)
CALCIUM SPEC-SCNC: 9.7 MG/DL (ref 8.6–10.5)
CHLORIDE SERPL-SCNC: 103 MMOL/L (ref 98–107)
CO2 SERPL-SCNC: 25.9 MMOL/L (ref 22–29)
CREAT SERPL-MCNC: 0.74 MG/DL (ref 0.57–1)
DEPRECATED RDW RBC AUTO: 45 FL (ref 37–54)
EOSINOPHIL # BLD AUTO: 1.02 10*3/MM3 (ref 0–0.4)
EOSINOPHIL NFR BLD AUTO: 7.9 % (ref 0.3–6.2)
ERYTHROCYTE [DISTWIDTH] IN BLOOD BY AUTOMATED COUNT: 12.3 % (ref 12.3–15.4)
GFR SERPL CREATININE-BSD FRML MDRD: 88 ML/MIN/1.73
GLUCOSE SERPL-MCNC: 133 MG/DL (ref 65–99)
HCT VFR BLD AUTO: 47.1 % (ref 34–46.6)
HGB BLD-MCNC: 15.7 G/DL (ref 12–15.9)
IMM GRANULOCYTES # BLD AUTO: 0.04 10*3/MM3 (ref 0–0.05)
IMM GRANULOCYTES NFR BLD AUTO: 0.3 % (ref 0–0.5)
LYMPHOCYTES # BLD AUTO: 3.71 10*3/MM3 (ref 0.7–3.1)
LYMPHOCYTES NFR BLD AUTO: 28.8 % (ref 19.6–45.3)
MCH RBC QN AUTO: 32.9 PG (ref 26.6–33)
MCHC RBC AUTO-ENTMCNC: 33.3 G/DL (ref 31.5–35.7)
MCV RBC AUTO: 98.7 FL (ref 79–97)
MONOCYTES # BLD AUTO: 0.88 10*3/MM3 (ref 0.1–0.9)
MONOCYTES NFR BLD AUTO: 6.8 % (ref 5–12)
NEUTROPHILS NFR BLD AUTO: 55.4 % (ref 42.7–76)
NEUTROPHILS NFR BLD AUTO: 7.13 10*3/MM3 (ref 1.7–7)
NRBC BLD AUTO-RTO: 0 /100 WBC (ref 0–0.2)
PLATELET # BLD AUTO: 246 10*3/MM3 (ref 140–450)
PMV BLD AUTO: 10.8 FL (ref 6–12)
POTASSIUM SERPL-SCNC: 4.1 MMOL/L (ref 3.5–5.2)
RBC # BLD AUTO: 4.77 10*6/MM3 (ref 3.77–5.28)
SODIUM SERPL-SCNC: 141 MMOL/L (ref 136–145)
WBC # BLD AUTO: 12.88 10*3/MM3 (ref 3.4–10.8)

## 2021-02-12 PROCEDURE — 80048 BASIC METABOLIC PNL TOTAL CA: CPT

## 2021-02-12 PROCEDURE — C9803 HOPD COVID-19 SPEC COLLECT: HCPCS

## 2021-02-12 PROCEDURE — 36415 COLL VENOUS BLD VENIPUNCTURE: CPT

## 2021-02-12 PROCEDURE — U0004 COV-19 TEST NON-CDC HGH THRU: HCPCS

## 2021-02-12 PROCEDURE — 85025 COMPLETE CBC W/AUTO DIFF WBC: CPT

## 2021-02-12 NOTE — DISCHARGE INSTRUCTIONS
TAKE the following medications the morning of surgery:  2/16/2021  Arrival time per MD   All heart or blood pressure medications    HOLD all diabetic medications the morning of surgery as ordered by physician.    Please discontinue all blood thinners and anticoagulants (except aspirin) prior to surgery as per your surgeon and cardiologist instructions.  Aspirin may be continued up to the day prior to surgery.     CHLORHEXIDINE CLOTHS GIVEN WITH INSTRUCTIONS AND FORM TO RETURN TO HOSPITAL, IF APPLICABLE.    General Instructions:  2/15/2021  · Do not eat or drink after midnight: includes water, mints, or gum. You may brush your teeth.  Dental appliances that are removable must be taken out day of surgery.  · Do not smoke, chew tobacco, or drink alcohol.  · Bring medications in original bottles, any inhalers and if applicable your C-PAP/BI-PAP machine.  · Bring any papers given to you in the doctor's office.  · Wear clean comfortable clothes and socks.  · Do not wear contact lenses or make-up. Bring a case for your glasses if applicable.  · Bring crutches or walker if applicable.  · Leave all other valuables and jewelry at home.    If you were given a blood bank ID arm band remember to bring it with you the day of surgery.    Preventing a Surgical Site Infection:  Shower the night before surgery (unless instructed other wise) using a fresh bar of anti-bacterial soap (such as Dial) and clean washcloth. Dry with a clean towel and dress in clean clothing.  For 2 to 3 days before surgery, avoid shaving with a razor near where you will have surgery because the razor can irritate skin and make it easier to develop an infection. Ask your surgeon if you will be receiving antibiotics prior to surgery.  Make sure you, your family, and all healthcare providers clear their hands with soap and water or an alcohol-based hand  before caring for you or your wound.  If at all possible, quit smoking as many days before surgery  as you can.    Day of surgery:  Upon arrival, a Pre-op nurse and Anesthesiologist will review your health history, obtain vital signs, and answer questions you may have. The only belongings needed at this time will be your home medications and if applicable your C-PAP/BI-PAP machine. If you are staying overnight your family can leave the rest of your belongings in the car and bring them to your room later. A Pre-op nurse will start an IV and you may receive medication in preparation for surgery, including something to help you relax. Your family will be able to see you in the Pre-op area. While you are in surgery your family should notify the waiting room  if they leave the waiting room area and provide a contact phone number.    Please be aware that surgery does come with discomfort. We want to make every effort to control your discomfort so please discuss any uncontrolled symptoms with your nurse. Your doctor will most likely have prescribed pain medications.    If you are going home after surgery you will receive individualized written care instructions before being discharged. A responsible adult must drive you to and from the hospital on the day of surgery and stay with you for 24 hours.    If you are staying overnight following surgery, you will be transported to your hospital room following the recovery period.  [facility] has all private rooms.    If you have any questions please call Pre-Admission Testing at [phone].  Deductibles and co-payments are collected on the day of service. Please be prepared to pay the required co-pay, deductible or deposit on the day of service as defined by your plan.    A RESPONSIBLE PERSON MUST REMAIN IN THE WAITING ROOM DURING YOUR PROCEDURE AND A RESPONSIBLE  MUST BE AVAILABLE UPON YOUR DISCHARGE.     urinary symptoms

## 2021-02-13 LAB — SARS-COV-2 RNA RESP QL NAA+PROBE: NOT DETECTED

## 2021-02-17 ENCOUNTER — ANESTHESIA EVENT (OUTPATIENT)
Dept: PERIOP | Facility: HOSPITAL | Age: 36
End: 2021-02-17

## 2021-02-17 ENCOUNTER — ANESTHESIA (OUTPATIENT)
Dept: PERIOP | Facility: HOSPITAL | Age: 36
End: 2021-02-17

## 2021-02-17 ENCOUNTER — HOSPITAL ENCOUNTER (OUTPATIENT)
Facility: HOSPITAL | Age: 36
Setting detail: HOSPITAL OUTPATIENT SURGERY
Discharge: HOME | End: 2021-02-17
Attending: SURGERY | Admitting: SURGERY

## 2021-02-17 VITALS
BODY MASS INDEX: 38.08 KG/M2 | TEMPERATURE: 97.8 F | WEIGHT: 281.13 LBS | RESPIRATION RATE: 18 BRPM | HEIGHT: 72 IN | HEART RATE: 83 BPM | OXYGEN SATURATION: 98 % | DIASTOLIC BLOOD PRESSURE: 80 MMHG | SYSTOLIC BLOOD PRESSURE: 118 MMHG

## 2021-02-17 DIAGNOSIS — N61.1 BREAST ABSCESS: ICD-10-CM

## 2021-02-17 LAB
B-HCG UR QL: NEGATIVE
GLUCOSE BLDC GLUCOMTR-MCNC: 188 MG/DL (ref 70–130)
INTERNAL NEGATIVE CONTROL: NEGATIVE
INTERNAL POSITIVE CONTROL: POSITIVE
Lab: NORMAL

## 2021-02-17 PROCEDURE — 81025 URINE PREGNANCY TEST: CPT | Performed by: ANESTHESIOLOGY

## 2021-02-17 PROCEDURE — 25010000002 ONDANSETRON PER 1 MG: Performed by: NURSE ANESTHETIST, CERTIFIED REGISTERED

## 2021-02-17 PROCEDURE — 25010000002 PROPOFOL 10 MG/ML EMULSION: Performed by: NURSE ANESTHETIST, CERTIFIED REGISTERED

## 2021-02-17 PROCEDURE — 25010000002 MIDAZOLAM PER 1 MG: Performed by: NURSE ANESTHETIST, CERTIFIED REGISTERED

## 2021-02-17 PROCEDURE — 25010000002 FENTANYL CITRATE (PF) 100 MCG/2ML SOLUTION: Performed by: NURSE ANESTHETIST, CERTIFIED REGISTERED

## 2021-02-17 PROCEDURE — 82962 GLUCOSE BLOOD TEST: CPT

## 2021-02-17 PROCEDURE — 19120 REMOVAL OF BREAST LESION: CPT | Performed by: SURGERY

## 2021-02-17 RX ORDER — ONDANSETRON 2 MG/ML
INJECTION INTRAMUSCULAR; INTRAVENOUS AS NEEDED
Status: DISCONTINUED | OUTPATIENT
Start: 2021-02-17 | End: 2021-02-17 | Stop reason: SURG

## 2021-02-17 RX ORDER — MIDAZOLAM HYDROCHLORIDE 1 MG/ML
2 INJECTION INTRAMUSCULAR; INTRAVENOUS
Status: DISCONTINUED | OUTPATIENT
Start: 2021-02-17 | End: 2021-02-17 | Stop reason: HOSPADM

## 2021-02-17 RX ORDER — SODIUM CHLORIDE, SODIUM LACTATE, POTASSIUM CHLORIDE, CALCIUM CHLORIDE 600; 310; 30; 20 MG/100ML; MG/100ML; MG/100ML; MG/100ML
125 INJECTION, SOLUTION INTRAVENOUS ONCE
Status: COMPLETED | OUTPATIENT
Start: 2021-02-17 | End: 2021-02-17

## 2021-02-17 RX ORDER — MIDAZOLAM HYDROCHLORIDE 1 MG/ML
INJECTION INTRAMUSCULAR; INTRAVENOUS AS NEEDED
Status: DISCONTINUED | OUTPATIENT
Start: 2021-02-17 | End: 2021-02-17 | Stop reason: SURG

## 2021-02-17 RX ORDER — SODIUM CHLORIDE 0.9 % (FLUSH) 0.9 %
10 SYRINGE (ML) INJECTION EVERY 12 HOURS SCHEDULED
Status: DISCONTINUED | OUTPATIENT
Start: 2021-02-17 | End: 2021-02-17 | Stop reason: HOSPADM

## 2021-02-17 RX ORDER — PROPOFOL 10 MG/ML
VIAL (ML) INTRAVENOUS AS NEEDED
Status: DISCONTINUED | OUTPATIENT
Start: 2021-02-17 | End: 2021-02-17 | Stop reason: SURG

## 2021-02-17 RX ORDER — SODIUM CHLORIDE, SODIUM LACTATE, POTASSIUM CHLORIDE, CALCIUM CHLORIDE 600; 310; 30; 20 MG/100ML; MG/100ML; MG/100ML; MG/100ML
INJECTION, SOLUTION INTRAVENOUS CONTINUOUS PRN
Status: DISCONTINUED | OUTPATIENT
Start: 2021-02-17 | End: 2021-02-17 | Stop reason: SURG

## 2021-02-17 RX ORDER — HYDROCODONE BITARTRATE AND ACETAMINOPHEN 7.5; 325 MG/1; MG/1
1 TABLET ORAL 4 TIMES DAILY PRN
Qty: 8 TABLET | Refills: 0 | Status: SHIPPED | OUTPATIENT
Start: 2021-02-17 | End: 2021-08-06

## 2021-02-17 RX ORDER — MEPERIDINE HYDROCHLORIDE 25 MG/ML
12.5 INJECTION INTRAMUSCULAR; INTRAVENOUS; SUBCUTANEOUS
Status: DISCONTINUED | OUTPATIENT
Start: 2021-02-17 | End: 2021-02-17 | Stop reason: HOSPADM

## 2021-02-17 RX ORDER — OXYCODONE HYDROCHLORIDE AND ACETAMINOPHEN 5; 325 MG/1; MG/1
1 TABLET ORAL ONCE AS NEEDED
Status: COMPLETED | OUTPATIENT
Start: 2021-02-17 | End: 2021-02-17

## 2021-02-17 RX ORDER — FENTANYL CITRATE 50 UG/ML
INJECTION, SOLUTION INTRAMUSCULAR; INTRAVENOUS AS NEEDED
Status: DISCONTINUED | OUTPATIENT
Start: 2021-02-17 | End: 2021-02-17 | Stop reason: SURG

## 2021-02-17 RX ORDER — BUPIVACAINE HYDROCHLORIDE AND EPINEPHRINE 5; 5 MG/ML; UG/ML
INJECTION, SOLUTION EPIDURAL; INTRACAUDAL; PERINEURAL AS NEEDED
Status: DISCONTINUED | OUTPATIENT
Start: 2021-02-17 | End: 2021-02-17 | Stop reason: HOSPADM

## 2021-02-17 RX ORDER — CEFDINIR 300 MG/1
300 CAPSULE ORAL 2 TIMES DAILY
Qty: 14 CAPSULE | Refills: 0 | Status: SHIPPED | OUTPATIENT
Start: 2021-02-17 | End: 2021-02-24

## 2021-02-17 RX ORDER — MAGNESIUM HYDROXIDE 1200 MG/15ML
LIQUID ORAL AS NEEDED
Status: DISCONTINUED | OUTPATIENT
Start: 2021-02-17 | End: 2021-02-17 | Stop reason: HOSPADM

## 2021-02-17 RX ORDER — FAMOTIDINE 10 MG/ML
INJECTION, SOLUTION INTRAVENOUS AS NEEDED
Status: DISCONTINUED | OUTPATIENT
Start: 2021-02-17 | End: 2021-02-17 | Stop reason: SURG

## 2021-02-17 RX ORDER — FENTANYL CITRATE 50 UG/ML
50 INJECTION, SOLUTION INTRAMUSCULAR; INTRAVENOUS
Status: DISCONTINUED | OUTPATIENT
Start: 2021-02-17 | End: 2021-02-17 | Stop reason: HOSPADM

## 2021-02-17 RX ORDER — CLINDAMYCIN PHOSPHATE 900 MG/50ML
900 INJECTION INTRAVENOUS ONCE
Status: COMPLETED | OUTPATIENT
Start: 2021-02-17 | End: 2021-02-17

## 2021-02-17 RX ORDER — SODIUM CHLORIDE 0.9 % (FLUSH) 0.9 %
10 SYRINGE (ML) INJECTION AS NEEDED
Status: DISCONTINUED | OUTPATIENT
Start: 2021-02-17 | End: 2021-02-17 | Stop reason: HOSPADM

## 2021-02-17 RX ORDER — MIDAZOLAM HYDROCHLORIDE 1 MG/ML
1 INJECTION INTRAMUSCULAR; INTRAVENOUS
Status: DISCONTINUED | OUTPATIENT
Start: 2021-02-17 | End: 2021-02-17 | Stop reason: HOSPADM

## 2021-02-17 RX ORDER — LIDOCAINE HYDROCHLORIDE 20 MG/ML
INJECTION, SOLUTION INFILTRATION; PERINEURAL AS NEEDED
Status: DISCONTINUED | OUTPATIENT
Start: 2021-02-17 | End: 2021-02-17 | Stop reason: SURG

## 2021-02-17 RX ORDER — ONDANSETRON 2 MG/ML
4 INJECTION INTRAMUSCULAR; INTRAVENOUS AS NEEDED
Status: DISCONTINUED | OUTPATIENT
Start: 2021-02-17 | End: 2021-02-17 | Stop reason: HOSPADM

## 2021-02-17 RX ORDER — IPRATROPIUM BROMIDE AND ALBUTEROL SULFATE 2.5; .5 MG/3ML; MG/3ML
3 SOLUTION RESPIRATORY (INHALATION) ONCE AS NEEDED
Status: DISCONTINUED | OUTPATIENT
Start: 2021-02-17 | End: 2021-02-17 | Stop reason: HOSPADM

## 2021-02-17 RX ADMIN — ONDANSETRON 4 MG: 2 INJECTION INTRAMUSCULAR; INTRAVENOUS at 11:08

## 2021-02-17 RX ADMIN — FENTANYL CITRATE 50 MCG: 50 INJECTION INTRAMUSCULAR; INTRAVENOUS at 10:48

## 2021-02-17 RX ADMIN — CLINDAMYCIN PHOSPHATE 900 MG: 900 INJECTION, SOLUTION INTRAVENOUS at 10:48

## 2021-02-17 RX ADMIN — FENTANYL CITRATE 50 MCG: 50 INJECTION INTRAMUSCULAR; INTRAVENOUS at 11:50

## 2021-02-17 RX ADMIN — FAMOTIDINE 20 MG: 10 INJECTION INTRAVENOUS at 11:08

## 2021-02-17 RX ADMIN — FENTANYL CITRATE 50 MCG: 50 INJECTION INTRAMUSCULAR; INTRAVENOUS at 11:35

## 2021-02-17 RX ADMIN — SODIUM CHLORIDE, POTASSIUM CHLORIDE, SODIUM LACTATE AND CALCIUM CHLORIDE: 600; 310; 30; 20 INJECTION, SOLUTION INTRAVENOUS at 10:48

## 2021-02-17 RX ADMIN — LIDOCAINE HYDROCHLORIDE 60 MG: 20 INJECTION, SOLUTION INFILTRATION; PERINEURAL at 10:52

## 2021-02-17 RX ADMIN — SODIUM CHLORIDE, POTASSIUM CHLORIDE, SODIUM LACTATE AND CALCIUM CHLORIDE 125 ML/HR: 600; 310; 30; 20 INJECTION, SOLUTION INTRAVENOUS at 10:11

## 2021-02-17 RX ADMIN — MIDAZOLAM 2 MG: 1 INJECTION INTRAMUSCULAR; INTRAVENOUS at 10:48

## 2021-02-17 RX ADMIN — PROPOFOL 200 MG: 10 INJECTION, EMULSION INTRAVENOUS at 10:52

## 2021-02-17 RX ADMIN — OXYCODONE HYDROCHLORIDE AND ACETAMINOPHEN 1 TABLET: 5; 325 TABLET ORAL at 11:52

## 2021-02-17 NOTE — ANESTHESIA PREPROCEDURE EVALUATION
Anesthesia Evaluation     Patient summary reviewed and Nursing notes reviewed   no history of anesthetic complications:  NPO Solid Status: > 8 hours  NPO Liquid Status: > 8 hours           Airway   Mallampati: II  TM distance: >3 FB  Neck ROM: full  No difficulty expected  Dental - normal exam         Pulmonary - normal exam    breath sounds clear to auscultation  (+) a smoker Current Smoked day of surgery,   Cardiovascular - negative cardio ROS and normal exam    Rhythm: regular        Neuro/Psych  (+) seizures,     GI/Hepatic/Renal/Endo    (+) obesity, morbid obesity,  diabetes mellitus type 2 poorly controlled,     Musculoskeletal     Abdominal  - normal exam   Substance History - negative use     OB/GYN negative ob/gyn ROS   (-)  Pregnant        Other   arthritis,          Phys Exam Other: Cap No. 9                Anesthesia Plan    ASA 2     general     intravenous induction     Anesthetic plan, all risks, benefits, and alternatives have been provided, discussed and informed consent has been obtained with: patient.    Plan discussed with CRNA.

## 2021-02-17 NOTE — ANESTHESIA POSTPROCEDURE EVALUATION
Patient: Marybeth Drake    Procedure Summary     Date: 02/17/21 Room / Location:  [facility] OR 02 /  [facility]    Anesthesia Start: 1048 Anesthesia Stop: 1127    Procedure: EXCISION RIGHT BREAST ABSCESS (Right Breast) Diagnosis:       Breast abscess      (Breast abscess [N61.1])    Surgeon: Mana Clark MD Provider: Charli Saenz DO    Anesthesia Type: general ASA Status: 2          Anesthesia Type: general    Vitals  Vitals Value Taken Time   /73 02/17/21 1200   Temp 97.8 °F (36.6 °C) 02/17/21 1130   Pulse 81 02/17/21 1200   Resp 11 02/17/21 1200   SpO2 98 % 02/17/21 1200           Post Anesthesia Care and Evaluation    Patient location during evaluation: PHASE II  Patient participation: complete - patient participated  Level of consciousness: awake and alert  Pain score: 1  Pain management: adequate  Airway patency: patent  Anesthetic complications: No anesthetic complications  PONV Status: controlled  Cardiovascular status: acceptable  Respiratory status: acceptable  Hydration status: euvolemic  No anesthesia care post op

## 2021-02-17 NOTE — OP NOTE
Excision recurrent right breast abscess    Pre-op:  Recurrent right breast abscess    Post-op:  Same    Surgeon:  Mana Clark M.D., F.A.C.S.    Assistant:  Kenneth Santana    Anesthesia:  general      Indications: Recurrent right breast abscess       Procedure Details   After obtaining informed consent and receiving preoperative antibiotics and with venous compression boots in place, the patient was taken to the operating room and placed under anesthesia.  The right breast was prepped and draped in a sterile fashion.  There was a small opening at the medial aspect of the previous scar.  The lacrimal probe was used to access the tract which went from medial to lateral, towards the nipple.  An elliptical incision excising this opening as well as the previous scar was made from the base of the nipple to the opening of the drainage.  Area was excised down to normal-appearing tissue.  No abscess was identified.  After irrigation and obtaining hemostasis Marcaine with epinephrine was injected for postoperative analgesia.  The skin was closed with interrupted 4-0 nylon simple and vertical mattress sutures.  Incision length was 3.7 cm.    Findings:    Estimated Blood Loss:  Minimal    Blood Administered: none           Drains: none           Specimens:   ID Type Source Tests Collected by Time   A : right breast biospy Tissue Breast, Right TISSUE PATHOLOGY EXAM Mana Clark MD 2/17/2021 1121        Grafts and Implants: No       Complications:  none           Disposition: PACU - hemodynamically stable.           Condition: stable

## 2021-02-17 NOTE — ANESTHESIA PROCEDURE NOTES
Airway  Urgency: elective    Date/Time: 2/17/2021 10:57 AM  End Time:2/17/2021 10:59 AM  Airway not difficult    General Information and Staff    Patient location during procedure: OR  Anesthesiologist: Charli Saenz DO  CRNA: Ruthann Stauffer CRNA    Indications and Patient Condition  Indications for airway management: airway protection    Preoxygenated: yes  MILS maintained throughout  Mask difficulty assessment: 1 - vent by mask    Final Airway Details  Final airway type: supraglottic airway      Successful airway: classic  Size 4    Number of attempts at approach: 1  Assessment: lips, teeth, and gum same as pre-op and atraumatic intubation    Additional Comments  AOI X 1 PASS +BBS, +ETCO2, +R//F/C MOUTH TEETH GUMS SAME AS PREOP

## 2021-02-19 LAB
LAB AP CASE REPORT: NORMAL
PATH REPORT.FINAL DX SPEC: NORMAL

## 2021-02-25 ENCOUNTER — OFFICE VISIT (OUTPATIENT)
Dept: SURGERY | Facility: CLINIC | Age: 36
End: 2021-02-25

## 2021-02-25 VITALS
BODY MASS INDEX: 37.95 KG/M2 | WEIGHT: 280.2 LBS | HEIGHT: 72 IN | HEART RATE: 92 BPM | SYSTOLIC BLOOD PRESSURE: 127 MMHG | DIASTOLIC BLOOD PRESSURE: 95 MMHG

## 2021-02-25 DIAGNOSIS — Z51.89 VISIT FOR WOUND CHECK: ICD-10-CM

## 2021-02-25 DIAGNOSIS — N61.1 BREAST ABSCESS: Primary | ICD-10-CM

## 2021-02-25 PROCEDURE — 99024 POSTOP FOLLOW-UP VISIT: CPT | Performed by: SURGERY

## 2021-02-25 RX ORDER — FLUCONAZOLE 150 MG/1
150 TABLET ORAL ONCE
Qty: 3 TABLET | Refills: 0 | Status: SHIPPED | OUTPATIENT
Start: 2021-02-25 | End: 2021-02-25

## 2021-02-25 NOTE — PROGRESS NOTES
"Subjective   Marybeth Drake is a 36 y.o. female here today for post op.    History of Present Illness  Ms. Drake was seen in the office today for first postoperative visit following reexcision of a recurrent right subareolar abscess.  Patient states that one of the stitches broke free and the area has opened up a little bit.  She is still on antibiotics.  Allergies   Allergen Reactions   • Phenobarbital Hallucinations         Current Outpatient Medications   Medication Sig Dispense Refill   • glimepiride (AMARYL) 1 MG tablet      • loratadine (CLARITIN) 10 MG tablet Take  by mouth.     • metFORMIN (GLUCOPHAGE) 850 MG tablet Take  by mouth.     • Multiple Vitamins-Calcium (One-A-Day Womens Formula) tablet Take  by mouth Daily.     • SITagliptin-metFORMIN HCl ER (Janumet XR) 100-1000 MG tablet Take 1 tablet by mouth Daily.     • HYDROcodone-acetaminophen (Norco) 7.5-325 MG per tablet Take 1 tablet by mouth 4 (Four) Times a Day As Needed for Moderate Pain . 8 tablet 0     No current facility-administered medications for this visit.        Objective   Ht 185.4 cm (73\")   Wt 127 kg (280 lb 3.2 oz)   LMP  (LMP Unknown)   BMI 36.97 kg/m²    Physical Exam  This is a well-developed well-nourished female in no acute distress  Right breast: There is a radial scar starting at the nipple 3:00 and extending medially.  The most medial aspect has opened up by about 3 x 2 mm.  The remainder of the incision is clean.  3 of the other sutures were removed.  There was an additional area where the skin appeared to open up a little bit and therefore the last suture was left in place.  Results/Data  Pathology report was reviewed and discussed with the patient    Procedures     Assessment/Plan   Status post reexcision of a right breast abscess    Follow-up in 10 days for wound recheck and to remove the last suture       Discussion/Summary  Patient's Body mass index is 36.97 kg/m². BMI is above normal parameters. Recommendations include: " educational material.       Future Appointments   Date Time Provider Department Center   2/25/2021  9:55 AM Mana Clark MD [facility] [place] [facility]   3/4/2021 10:00 AM [facility]  [facility]         Please note that portions of this note were completed with a voice recognition program.

## 2021-03-04 ENCOUNTER — HOSPITAL ENCOUNTER (OUTPATIENT)
Dept: MAMMOGRAPHY | Facility: HOSPITAL | Age: 36
Discharge: HOME | End: 2021-03-04
Admitting: ADVANCED PRACTICE MIDWIFE

## 2021-03-04 DIAGNOSIS — R92.8 ABNORMAL MAMMOGRAM: ICD-10-CM

## 2021-03-04 PROCEDURE — G0279 TOMOSYNTHESIS, MAMMO: HCPCS

## 2021-03-04 PROCEDURE — 77066 DX MAMMO INCL CAD BI: CPT

## 2021-03-04 PROCEDURE — 77066 DX MAMMO INCL CAD BI: CPT | Performed by: RADIOLOGY

## 2021-03-04 PROCEDURE — 77062 BREAST TOMOSYNTHESIS BI: CPT | Performed by: RADIOLOGY

## 2021-03-08 ENCOUNTER — OFFICE VISIT (OUTPATIENT)
Dept: SURGERY | Facility: CLINIC | Age: 36
End: 2021-03-08

## 2021-03-08 VITALS
SYSTOLIC BLOOD PRESSURE: 119 MMHG | BODY MASS INDEX: 37.68 KG/M2 | WEIGHT: 278.2 LBS | HEART RATE: 90 BPM | HEIGHT: 72 IN | DIASTOLIC BLOOD PRESSURE: 85 MMHG

## 2021-03-08 DIAGNOSIS — N61.1 BREAST ABSCESS: Primary | ICD-10-CM

## 2021-03-08 DIAGNOSIS — Z51.89 VISIT FOR WOUND CHECK: ICD-10-CM

## 2021-03-08 PROCEDURE — 99024 POSTOP FOLLOW-UP VISIT: CPT | Performed by: SURGERY

## 2021-03-08 NOTE — PROGRESS NOTES
"Subjective   Marybeth Drake is a 36 y.o. female here today for post op.    History of Present Illness  Ms. Drake was seen in the office today for her second postoperative visit following reexcision of a recurrent right subareolar abscess.    Most of the sutures were removed at the first postoperative visit but she presents to have the rest of her sutures removed.  She has completed her course of antibiotics.  Allergies   Allergen Reactions   • Phenobarbital Hallucinations         Current Outpatient Medications   Medication Sig Dispense Refill   • glimepiride (AMARYL) 1 MG tablet      • loratadine (CLARITIN) 10 MG tablet Take  by mouth.     • metFORMIN (GLUCOPHAGE) 850 MG tablet Take  by mouth.     • Multiple Vitamins-Calcium (One-A-Day Womens Formula) tablet Take  by mouth Daily.     • SITagliptin-metFORMIN HCl ER (Janumet XR) 100-1000 MG tablet Take 1 tablet by mouth Daily.     • HYDROcodone-acetaminophen (Norco) 7.5-325 MG per tablet Take 1 tablet by mouth 4 (Four) Times a Day As Needed for Moderate Pain . 8 tablet 0     No current facility-administered medications for this visit.       Objective   Ht 185.4 cm (73\")   Wt 126 kg (278 lb 3.2 oz)   LMP  (LMP Unknown)   BMI 36.70 kg/m²    Physical Exam  Right breast: On examination of the right nipple areolar area there is still some induration.  There is no purulent drainage or cellulitis.  There is no fluctuance.  Remaining sutures were removed without complication.  Results/Data  Pathology report was reviewed and did demonstrate residual abscess    Procedures     Assessment/Plan   Status post reexcision of right breast abscess    Follow-up as needed       Discussion/Summary  Patient's Body mass index is 36.7 kg/m². BMI is above normal parameters. Recommendations include: educational material.       No future appointments.      Please note that portions of this note were completed with a voice recognition program.  "

## 2021-03-16 ENCOUNTER — BULK ORDERING (OUTPATIENT)
Dept: CASE MANAGEMENT | Facility: OTHER | Age: 36
End: 2021-03-16

## 2021-03-16 DIAGNOSIS — Z23 IMMUNIZATION DUE: ICD-10-CM

## 2021-04-12 ENCOUNTER — OFFICE VISIT (OUTPATIENT)
Dept: SURGERY | Facility: CLINIC | Age: 36
End: 2021-04-12

## 2021-04-12 VITALS
DIASTOLIC BLOOD PRESSURE: 92 MMHG | SYSTOLIC BLOOD PRESSURE: 136 MMHG | HEIGHT: 72 IN | BODY MASS INDEX: 38.14 KG/M2 | HEART RATE: 85 BPM | WEIGHT: 281.6 LBS

## 2021-04-12 DIAGNOSIS — L24.A9 WOUND DRAINAGE: Primary | ICD-10-CM

## 2021-04-12 PROCEDURE — 99024 POSTOP FOLLOW-UP VISIT: CPT | Performed by: SURGERY

## 2021-04-12 NOTE — PROGRESS NOTES
Subjective   Marybeth Drake is a 36 y.o. female here today because old wound has re-opened.    History of Present Illness  Ms. Drake was seen in the office today for breast follow-up.  She was last seen in the office on 3/8/2021 following an excision of a recurrent right breast abscess on 2/17/2021.  Patient states that recently she had some soreness in the area then a small amount of drainage for 2 days.  It then resolved on its own and the patient has not had any further drainage.  She was not on antibiotics.  Previous wound culture grew strep  Allergies   Allergen Reactions   • Phenobarbital Hallucinations       Current Outpatient Medications   Medication Sig Dispense Refill   • glimepiride (AMARYL) 1 MG tablet      • loratadine (CLARITIN) 10 MG tablet Take  by mouth.     • metFORMIN (GLUCOPHAGE) 850 MG tablet Take  by mouth.     • Multiple Vitamins-Calcium (One-A-Day Womens Formula) tablet Take  by mouth Daily.     • SITagliptin-metFORMIN HCl ER (Janumet XR) 100-1000 MG tablet Take 1 tablet by mouth Daily.     • HYDROcodone-acetaminophen (Norco) 7.5-325 MG per tablet Take 1 tablet by mouth 4 (Four) Times a Day As Needed for Moderate Pain . 8 tablet 0     No current facility-administered medications for this visit.     Past Medical History:   Diagnosis Date   • Abscess     right breast   • Alopecia areata    • Arthritis    • Diabetes mellitus (CMS/HCC)    • Missed ab    • MRSA (methicillin resistant staph aureus) culture positive    • Seizures (CMS/HCC)      Past Surgical History:   Procedure Laterality Date   • ARM HARDWARE REMOVAL Right    • BREAST BIOPSY Right 2/17/2021    Procedure: EXCISION RIGHT BREAST ABSCESS;  Surgeon: Mana Clark MD;  Location: [facility] OR;  Service: General;  Laterality: Right;   • D & C WITH SUCTION     • INCISION AND DRAINAGE ABSCESS N/A 1/8/2021    Procedure: INCISION AND DRAINAGE ABSCESS right breast;  Surgeon: Mana Clark MD;  Location: [facility] OR;  Service: General;   "Laterality: N/A;   • SHOULDER SURGERY Right        Pertinent Review of Systems      Objective   /92 (BP Location: Left arm)   Pulse 85   Ht 185.4 cm (73\")   Wt 128 kg (281 lb 9.6 oz)   BMI 37.15 kg/m²    Physical Exam  Right breast: On examination of the breast there is a scar in the 6 o'clock position.  There is some mild induration around this.  There is no cellulitis.  There is no current drainage there is a very small opening where the patient recently had her reported drainage.  There is no evidence of abscess.  Procedures     Results/Data:  Imaging:   Notes:   Lab: Prior culture result was reviewed.  Other:     Assessment/Plan   Wound drainage at the site of recurrent breast abscess.    Plan: As the drainage resolved quickly without antibiotics would give patient an additional trial of antibiotics before recommending reexcision.  Given the previous culture of strep, Dalvance would be a good choice.  Patient will be scheduled to receive a dose of this from the infusion clinic and then follow-up as needed.       Discussion/Summary:    Time spent:     Patient's Body mass index is 37.15 kg/m². BMI is above normal parameters. Recommendations include: educational material.         No future appointments.      Please note that portions of this note were completed with a voice recognition program.  "

## 2021-04-13 DIAGNOSIS — N61.1 BREAST ABSCESS: Primary | ICD-10-CM

## 2021-04-15 ENCOUNTER — HOSPITAL ENCOUNTER (OUTPATIENT)
Dept: INFUSION THERAPY | Facility: HOSPITAL | Age: 36
Discharge: HOME | End: 2021-04-15
Admitting: SURGERY

## 2021-04-15 VITALS
SYSTOLIC BLOOD PRESSURE: 120 MMHG | TEMPERATURE: 98.5 F | DIASTOLIC BLOOD PRESSURE: 74 MMHG | RESPIRATION RATE: 18 BRPM | HEART RATE: 79 BPM

## 2021-04-15 DIAGNOSIS — N61.1 BREAST ABSCESS: Primary | ICD-10-CM

## 2021-04-15 LAB — GLUCOSE BLDC GLUCOMTR-MCNC: 227 MG/DL (ref 70–130)

## 2021-04-15 PROCEDURE — 96365 THER/PROPH/DIAG IV INF INIT: CPT

## 2021-04-15 PROCEDURE — 82962 GLUCOSE BLOOD TEST: CPT

## 2021-04-15 PROCEDURE — 25010000002 DALBAVANCIN 500 MG RECONSTITUTED SOLUTION 1 EACH VIAL: Performed by: SURGERY

## 2021-04-15 RX ORDER — DEXTROSE MONOHYDRATE 50 MG/ML
50 INJECTION, SOLUTION INTRAVENOUS CONTINUOUS
Status: DISCONTINUED | OUTPATIENT
Start: 2021-04-15 | End: 2021-04-17 | Stop reason: HOSPADM

## 2021-04-15 RX ORDER — DEXTROSE MONOHYDRATE 50 MG/ML
50 INJECTION, SOLUTION INTRAVENOUS CONTINUOUS
Status: CANCELLED
Start: 2021-04-15

## 2021-04-15 RX ADMIN — DALBAVANCIN 1500 MG: 500 INJECTION, POWDER, FOR SOLUTION INTRAVENOUS at 13:43

## 2021-04-15 RX ADMIN — DEXTROSE MONOHYDRATE 50 ML: 5 INJECTION, SOLUTION INTRAVENOUS at 13:43

## 2021-04-15 NOTE — PATIENT INSTRUCTIONS
Dalbavancin injection  What is this medicine?  DALBAVANCIN (IMER ba van sin) is an antibiotic. It is used to treat certain kinds of bacterial infections. It will not work for colds, flu, or other viral infections.  This medicine may be used for other purposes; ask your health care provider or pharmacist if you have questions.  COMMON BRAND NAME(S): DALVANCE  What should I tell my health care provider before I take this medicine?  They need to know if you have any of these conditions:  · intestine problems, like colitis  · an unusual or allergic reaction to dalbavancin, other medicines, foods, dyes, or preservatives  · pregnant or trying to get pregnant  · breast-feeding  How should I use this medicine?  This medicine is infused into a vein. It is usually given by a health care professional in a hospital or clinic setting.  If you get this medicine at home, you will be taught how to prepare and give this medicine. Use exactly as directed. Take your medicine at regular intervals. Do not take your medicine more often than directed.  It is important that you put your used needles and syringes in a special sharps container. Do not put them in a trash can. If you do not have a sharps container, call your pharmacist or healthcare provider to get one.  Talk to your pediatrician regarding the use of this medicine in children. Special care may be needed.  Overdosage: If you think you have taken too much of this medicine contact a poison control center or emergency room at once.  NOTE: This medicine is only for you. Do not share this medicine with others.  What if I miss a dose?  It is important not to miss your dose. Call your doctor or health care professional if you are unable to keep an appointment. If you give yourself the medicine and you miss a dose, take it as soon as you can. If it is almost time for your next dose, take only that dose. Do not take double or extra doses.  What may interact with this medicine?  This  medicine may interact with the following medications:  · birth control pills  This list may not describe all possible interactions. Give your health care provider a list of all the medicines, herbs, non-prescription drugs, or dietary supplements you use. Also tell them if you smoke, drink alcohol, or use illegal drugs. Some items may interact with your medicine.  What should I watch for while using this medicine?  Tell your doctor or healthcare professional if your symptoms do not start to get better or if they get worse.  Do not treat diarrhea with over the counter products. Contact your doctor if you have diarrhea that lasts more than 2 days or if it is severe and watery.  What side effects may I notice from receiving this medicine?  Side effects that you should report to your doctor or health care professional as soon as possible:  · allergic reactions like skin rash, itching or hives, swelling of the face, lips, or tongue  · bloody or watery diarrhea  Side effects that usually do not require medical attention (report to your doctor or health care professional if they continue or are bothersome):  · diarrhea  · headache  · nausea, vomiting  This list may not describe all possible side effects. Call your doctor for medical advice about side effects. You may report side effects to FDA at 1-430-FDA-7268.  Where should I keep my medicine?  Keep out of the reach of children.  This drug is usually given in a hospital or clinic and will not be stored at home. In rare cases, this medicine may be given at home.  If you are using this medicine at home, you will be instructed on how to store this medicine. Throw away any unused medicine after the expiration date on the label.  NOTE: This sheet is a summary. It may not cover all possible information. If you have questions about this medicine, talk to your doctor, pharmacist, or health care provider.  © 2021 [employer] (2017-01-19 08:38:08)

## 2021-05-03 ENCOUNTER — OFFICE VISIT (OUTPATIENT)
Dept: SURGERY | Facility: CLINIC | Age: 36
End: 2021-05-03

## 2021-05-03 VITALS
DIASTOLIC BLOOD PRESSURE: 85 MMHG | WEIGHT: 277.8 LBS | HEART RATE: 92 BPM | RESPIRATION RATE: 18 BRPM | SYSTOLIC BLOOD PRESSURE: 127 MMHG | BODY MASS INDEX: 37.63 KG/M2 | HEIGHT: 72 IN

## 2021-05-03 DIAGNOSIS — N61.1 BREAST ABSCESS: Primary | ICD-10-CM

## 2021-05-03 PROCEDURE — 99024 POSTOP FOLLOW-UP VISIT: CPT | Performed by: SURGERY

## 2021-05-03 NOTE — PROGRESS NOTES
Subjective   Marybeth Drake is a 36 y.o. female follow up breast drainage  History of Present Illness  Ms. Drake was seen in the office today for breast follow-up.  She was last seen in the office on 3/8/2021 following an excision of a recurrent right breast abscess on 2/17/2021.  Patient states that recently she had some soreness in the area then a small amount of drainage for 2 days.  It then resolved on its own and the patient has not had any further drainage.  She was seen in the office on 4/12/2021.  Following that she received a dose of Dalvance.  She states that once she noticed a small amount of yellow drainage.  But overall it has decreased.  Allergies   Allergen Reactions   • Phenobarbital Hallucinations       Current Outpatient Medications   Medication Sig Dispense Refill   • glimepiride (AMARYL) 1 MG tablet      • loratadine (CLARITIN) 10 MG tablet Take  by mouth.     • metFORMIN (GLUCOPHAGE) 850 MG tablet Take  by mouth.     • Multiple Vitamins-Calcium (One-A-Day Womens Formula) tablet Take  by mouth Daily.     • SITagliptin-metFORMIN HCl ER (Janumet XR) 100-1000 MG tablet Take 1 tablet by mouth Daily.     • HYDROcodone-acetaminophen (Norco) 7.5-325 MG per tablet Take 1 tablet by mouth 4 (Four) Times a Day As Needed for Moderate Pain . 8 tablet 0     No current facility-administered medications for this visit.     Past Medical History:   Diagnosis Date   • Abscess     right breast   • Alopecia areata    • Arthritis    • Diabetes mellitus (CMS/HCC)    • Missed ab    • MRSA (methicillin resistant staph aureus) culture positive    • Seizures (CMS/HCC)      Past Surgical History:   Procedure Laterality Date   • ARM HARDWARE REMOVAL Right    • BREAST BIOPSY Right 2/17/2021    Procedure: EXCISION RIGHT BREAST ABSCESS;  Surgeon: Mana Clark MD;  Location: [facility];  Service: General;  Laterality: Right;   • D & C WITH SUCTION     • INCISION AND DRAINAGE ABSCESS N/A 1/8/2021    Procedure: INCISION AND  DRAINAGE ABSCESS right breast;  Surgeon: Mana Clark MD;  Location:  [facility] OR;  Service: General;  Laterality: N/A;   • SHOULDER SURGERY Right        Pertinent Review of Systems  Patient reports a soreness on the right tip of the nose.    Exam:  Morbidly obese female in no acute distress  HEENT examination: There is no lesion on the distal end of the nose.  No evidence of infection.  Slightly more full than the contralateral side  Lungs: Clear  Heart: Regular rate and rhythm  Right breast: There is a scar in the 3 o'clock position transversely overlying the nipple areolar complex.  No drainage is appreciated today.  There is some chronic scarring.  There is minimal induration.  Procedures     Results/Data:  Imaging:   Notes:   Lab:   Other:     Assessment/Plan   Chronic recurrent right breast abscess.     Will continue to monitor.  If patient has recurrent drainage will proceed with reexcision.  Patient advised to contact me within the next 30 days so she would not need to be seen back in the office prior       Discussion/Summary:    Time spent:     Patient's Body mass index is 36.65 kg/m². BMI is above normal parameters. Recommendations include: educational material.         Future Appointments   Date Time Provider Department Center   6/3/2021 12:30 PM [facility]   6/3/2021  1:00 PM [facility]         Please note that portions of this note were completed with a voice recognition program.

## 2021-06-03 ENCOUNTER — APPOINTMENT (OUTPATIENT)
Dept: MAMMOGRAPHY | Facility: HOSPITAL | Age: 36
End: 2021-06-03

## 2021-06-03 ENCOUNTER — APPOINTMENT (OUTPATIENT)
Dept: ULTRASOUND IMAGING | Facility: HOSPITAL | Age: 36
End: 2021-06-03

## 2021-06-07 ENCOUNTER — OFFICE VISIT (OUTPATIENT)
Dept: SURGERY | Facility: CLINIC | Age: 36
End: 2021-06-07

## 2021-06-07 VITALS
HEIGHT: 72 IN | BODY MASS INDEX: 37.3 KG/M2 | HEART RATE: 97 BPM | DIASTOLIC BLOOD PRESSURE: 91 MMHG | SYSTOLIC BLOOD PRESSURE: 147 MMHG | WEIGHT: 275.4 LBS

## 2021-06-07 DIAGNOSIS — N61.1 BREAST ABSCESS: Primary | ICD-10-CM

## 2021-06-07 PROCEDURE — 99212 OFFICE O/P EST SF 10 MIN: CPT | Performed by: SURGERY

## 2021-06-07 NOTE — PROGRESS NOTES
Subjective   Marybeth Drake is a 36 y.o. female here today for more drainage.    History of Present Illness  Ms. Drake was seen in the office today for breast follow-up.  She had an excision of a recurrent right breast abscess on 2/17/2021.  In April the patient had some soreness in the small amount of drainage from the incision site for 2 days.  It resolved on its own without antibiotics. She was seen in the office on 4/12/2021.  Following that she received a dose of Dalvance.  Her last visit was 5/3/2021.  She had one episode of a very small amount of drainage that lasted less than a day and resolved on its own and was not associated with swelling or cellulitis.  She was advised to return in 30 days for follow-up to determine whether or not a reexcision needed to be done.  The patient states that in the last 30 days she had a one-time episode of a very small amount of drainage which cleared spontaneously.  Allergies   Allergen Reactions   • Phenobarbital Hallucinations       Current Outpatient Medications   Medication Sig Dispense Refill   • glimepiride (AMARYL) 1 MG tablet      • loratadine (CLARITIN) 10 MG tablet Take  by mouth.     • metFORMIN (GLUCOPHAGE) 850 MG tablet Take  by mouth.     • Multiple Vitamins-Calcium (One-A-Day Womens Formula) tablet Take  by mouth Daily.     • SITagliptin-metFORMIN HCl ER (Janumet XR) 100-1000 MG tablet Take 1 tablet by mouth Daily.     • HYDROcodone-acetaminophen (Norco) 7.5-325 MG per tablet Take 1 tablet by mouth 4 (Four) Times a Day As Needed for Moderate Pain . 8 tablet 0     No current facility-administered medications for this visit.     Past Medical History:   Diagnosis Date   • Abscess     right breast   • Alopecia areata    • Arthritis    • Diabetes mellitus (CMS/HCC)    • Missed ab    • MRSA (methicillin resistant staph aureus) culture positive    • Seizures (CMS/HCC)      Past Surgical History:   Procedure Laterality Date   • ARM HARDWARE REMOVAL Right    • BREAST  "BIOPSY Right 2/17/2021    Procedure: EXCISION RIGHT BREAST ABSCESS;  Surgeon: Mana Clark MD;  Location:  [facility];  Service: General;  Laterality: Right;   • D & C WITH SUCTION     • INCISION AND DRAINAGE ABSCESS N/A 1/8/2021    Procedure: INCISION AND DRAINAGE ABSCESS right breast;  Surgeon: Mana Clark MD;  Location:  [facility];  Service: General;  Laterality: N/A;   • SHOULDER SURGERY Right        Pertinent Review of Systems    Objective   Ht 185.4 cm (73\")   Wt 125 kg (275 lb 6.4 oz)   BMI 36.33 kg/m²    Physical Exam  Right breast: There is a transverse scar in the 3:00 position starting at the nipple and extending just medial to the edge of the areola.  There is a small scab but there is no cellulitis or drainage.  There is no evidence of a chronic fistula today.  Procedures     Results/Data:  Imaging:   Notes:   Lab:   Other:     Assessment/Plan   Recurrent right breast drainage/chronic fistula    Follow-up as needed       Discussion/Summary: Options were discussed with the patient.  Most aggressive would be reexcision at this time.  Given that the patient is having only small amounts of drainage without associated cellulitis once a month it is certainly reasonable not to do anything further if she is comfortable with this.  The patient would like to avoid any further surgery if possible.  She is aware if the drainage increases or she develops signs of abscess to return for further evaluation.    Time spent:     Patient's Body mass index is 36.33 kg/m². indicating that she is overweight (BMI 25-29.9). Obesity-related health conditions include the following: none. Obesity is newly identified. BMI is is above average; BMI management plan is completed. We discussed portion control and increasing exercise..         Future Appointments   Date Time Provider Department Center   7/1/2021  8:30 AM [facility] 1 [facility]   7/1/2021  9:30 AM [facility] US 1 [facility]         Please " note that portions of this note were completed with a voice recognition program.

## 2021-07-01 ENCOUNTER — HOSPITAL ENCOUNTER (OUTPATIENT)
Dept: MAMMOGRAPHY | Facility: HOSPITAL | Age: 36
Discharge: HOME | End: 2021-07-01

## 2021-07-01 ENCOUNTER — HOSPITAL ENCOUNTER (OUTPATIENT)
Dept: ULTRASOUND IMAGING | Facility: HOSPITAL | Age: 36
Discharge: HOME | End: 2021-07-01

## 2021-07-01 DIAGNOSIS — R92.8 ABNORMAL MAMMOGRAM: ICD-10-CM

## 2021-07-01 PROCEDURE — 77062 BREAST TOMOSYNTHESIS BI: CPT | Performed by: RADIOLOGY

## 2021-07-01 PROCEDURE — 77066 DX MAMMO INCL CAD BI: CPT | Performed by: RADIOLOGY

## 2021-07-01 PROCEDURE — 77066 DX MAMMO INCL CAD BI: CPT

## 2021-07-01 PROCEDURE — G0279 TOMOSYNTHESIS, MAMMO: HCPCS

## 2021-07-30 ENCOUNTER — OFFICE VISIT (OUTPATIENT)
Dept: SURGERY | Facility: CLINIC | Age: 36
End: 2021-07-30

## 2021-07-30 VITALS
HEART RATE: 96 BPM | BODY MASS INDEX: 37.68 KG/M2 | WEIGHT: 278.2 LBS | HEIGHT: 72 IN | DIASTOLIC BLOOD PRESSURE: 62 MMHG | SYSTOLIC BLOOD PRESSURE: 94 MMHG

## 2021-07-30 DIAGNOSIS — N61.1 BREAST ABSCESS OF FEMALE: Primary | ICD-10-CM

## 2021-07-30 DIAGNOSIS — N61.1 BREAST ABSCESS: Primary | ICD-10-CM

## 2021-07-30 PROCEDURE — 87070 CULTURE OTHR SPECIMN AEROBIC: CPT | Performed by: SURGERY

## 2021-07-30 PROCEDURE — 87205 SMEAR GRAM STAIN: CPT | Performed by: SURGERY

## 2021-07-30 PROCEDURE — 87186 SC STD MICRODIL/AGAR DIL: CPT | Performed by: SURGERY

## 2021-07-30 PROCEDURE — 87077 CULTURE AEROBIC IDENTIFY: CPT | Performed by: SURGERY

## 2021-07-30 PROCEDURE — 99213 OFFICE O/P EST LOW 20 MIN: CPT | Performed by: SURGERY

## 2021-07-30 RX ORDER — CLINDAMYCIN PHOSPHATE 900 MG/50ML
900 INJECTION INTRAVENOUS ONCE
Status: CANCELLED | OUTPATIENT
Start: 2021-07-30 | End: 2021-07-30

## 2021-07-30 NOTE — PROGRESS NOTES
Subjective   Marybeth Drake is a 36 y.o. female here today for possible abscess right breast.    History of Present Illness  Ms. Drake was seen in the office today for breast follow-up.  She had an excision of a recurrent right breast abscess on 2/17/2021.  In April the patient had some soreness in the small amount of drainage from the incision site for 2 days.  It resolved on its own without antibiotics. She was seen in the office on 4/12/2021.  Following that she received a dose of Dalvance.   She has had a couple of very self-limited episodes of drainage from the surgical site which have lasted a day or 2 and then resolved on their own without antibiotics.  Her last office visit was 6/7/2021.  At that time the options were discussed and as it was such a brief episode of drainage patient elected not to have a further exploration.  She presents to the office today stating that over the last several days she has had some swelling and tenderness in the nipple and had drainage of less than a cc of purulent fluid.  No fever or chills  Allergies   Allergen Reactions   • Phenobarbital Hallucinations       Current Outpatient Medications   Medication Sig Dispense Refill   • glimepiride (AMARYL) 1 MG tablet      • loratadine (CLARITIN) 10 MG tablet Take  by mouth.     • metFORMIN (GLUCOPHAGE) 850 MG tablet Take  by mouth.     • Multiple Vitamins-Calcium (One-A-Day Womens Formula) tablet Take  by mouth Daily.     • SITagliptin-metFORMIN HCl ER (Janumet XR) 100-1000 MG tablet Take 1 tablet by mouth Daily.     • HYDROcodone-acetaminophen (Norco) 7.5-325 MG per tablet Take 1 tablet by mouth 4 (Four) Times a Day As Needed for Moderate Pain . 8 tablet 0     No current facility-administered medications for this visit.     Past Medical History:   Diagnosis Date   • Abscess     right breast   • Alopecia areata    • Arthritis    • Diabetes mellitus (CMS/[facility])    • Missed ab    • MRSA (methicillin resistant staph aureus) culture positive   "  • Seizures (CMS/HCC)      Past Surgical History:   Procedure Laterality Date   • ARM HARDWARE REMOVAL Right    • BREAST BIOPSY Right 2/17/2021    Procedure: EXCISION RIGHT BREAST ABSCESS;  Surgeon: Mana Clark MD;  [facility]:  [facility];  Service: General;  Laterality: Right;   • D & C WITH SUCTION     • INCISION AND DRAINAGE ABSCESS N/A 1/8/2021    Procedure: INCISION AND DRAINAGE ABSCESS right breast;  Surgeon: Mana Clark MD;  [facility]:  [facility];  Service: General;  Laterality: N/A;   • SHOULDER SURGERY Right        Pertinent Review of Systems    Objective   BP 94/62 (BP Location: Left arm)   Pulse 96   Ht 185.4 cm (73\")   Wt 126 kg (278 lb 3.2 oz)   BMI 36.70 kg/m²    Physical Exam  Well-developed well-nourished female no acute distress  Neck:  No supraclavicular adenopathy  Lungs:  Respiratory effort normal. Auscultation: Clear, without wheezes, rhonchi, rales  Heart:  Regular rate and rhythm, without murmur, gallop, rub.  No pedal edema  Breasts: On visual inspection the breasts are symmetrical.  Examination of the right breast demonstrates a radial scar in the 3 o'clock position starting at the nipple and extending medially.  There is no cellulitis.  There is a punctate opening in the mid area of the scar from which a very small amount of cloudy drainage was elicited with the patient squeezing the area.  No palpable masses.  No cellulitis.  No axillary adenopathy.  Examination of the left breast demonstrates no discrete mass, skin change, or axillary adenopathy.       Procedures   Culture obtained of the fluid in the right breast  Results/Data:  Imaging:   Notes:   Lab:   Other:     Assessment/Plan    Recurrent right breast drainage/chronic fistula    Proceed with reexcision of the area.       Discussion/Summary: Clearly there is a small area of the fistula remaining as drainage is limited and resolves without antibiotics.  However it is clear that antibiotics alone will not resolve    Time " spent:     Patient's Body mass index is 36.7 kg/m². indicating that she is overweight (BMI 25-29.9). Obesity-related health conditions include the following: none. Obesity is improving with lifestyle modifications. BMI is is above average; BMI management plan is completed. We discussed portion control and increasing exercise..         No future appointments.      Please note that portions of this note were completed with a voice recognition program.

## 2021-07-30 NOTE — H&P
Subjective   Marybeth Drake is a 36 y.o. female here today for possible abscess right breast.    History of Present Illness  Ms. Drake was seen in the office today for breast follow-up.  She had an excision of a recurrent right breast abscess on 2/17/2021.  In April the patient had some soreness in the small amount of drainage from the incision site for 2 days.  It resolved on its own without antibiotics. She was seen in the office on 4/12/2021.  Following that she received a dose of Dalvance.   She has had a couple of very self-limited episodes of drainage from the surgical site which have lasted a day or 2 and then resolved on their own without antibiotics.  Her last office visit was 6/7/2021.  At that time the options were discussed and as it was such a brief episode of drainage patient elected not to have a further exploration.  She presents to the office today stating that over the last several days she has had some swelling and tenderness in the nipple and had drainage of less than a cc of purulent fluid.  No fever or chills  Allergies   Allergen Reactions   • Phenobarbital Hallucinations       Current Outpatient Medications   Medication Sig Dispense Refill   • glimepiride (AMARYL) 1 MG tablet      • loratadine (CLARITIN) 10 MG tablet Take  by mouth.     • metFORMIN (GLUCOPHAGE) 850 MG tablet Take  by mouth.     • Multiple Vitamins-Calcium (One-A-Day Womens Formula) tablet Take  by mouth Daily.     • SITagliptin-metFORMIN HCl ER (Janumet XR) 100-1000 MG tablet Take 1 tablet by mouth Daily.     • HYDROcodone-acetaminophen (Norco) 7.5-325 MG per tablet Take 1 tablet by mouth 4 (Four) Times a Day As Needed for Moderate Pain . 8 tablet 0     No current facility-administered medications for this visit.     Past Medical History:   Diagnosis Date   • Abscess     right breast   • Alopecia areata    • Arthritis    • Diabetes mellitus (CMS/[facility])    • Missed ab    • MRSA (methicillin resistant staph aureus) culture positive   "  • Seizures (CMS/HCC)      Past Surgical History:   Procedure Laterality Date   • ARM HARDWARE REMOVAL Right    • BREAST BIOPSY Right 2/17/2021    Procedure: EXCISION RIGHT BREAST ABSCESS;  Surgeon: Mana Clark MD;  [facility]:  [facility];  Service: General;  Laterality: Right;   • D & C WITH SUCTION     • INCISION AND DRAINAGE ABSCESS N/A 1/8/2021    Procedure: INCISION AND DRAINAGE ABSCESS right breast;  Surgeon: Mana Clark MD;  [facility];  Service: General;  Laterality: N/A;   • SHOULDER SURGERY Right        Pertinent Review of Systems    Objective   BP 94/62 (BP Location: Left arm)   Pulse 96   Ht 185.4 cm (73\")   Wt 126 kg (278 lb 3.2 oz)   BMI 36.70 kg/m²    Physical Exam  Well-developed well-nourished female no acute distress  Neck:  No supraclavicular adenopathy  Lungs:  Respiratory effort normal. Auscultation: Clear, without wheezes, rhonchi, rales  Heart:  Regular rate and rhythm, without murmur, gallop, rub.  No pedal edema  Breasts: On visual inspection the breasts are symmetrical.  Examination of the right breast demonstrates a radial scar in the 3 o'clock position starting at the nipple and extending medially.  There is no cellulitis.  There is a punctate opening in the mid area of the scar from which a very small amount of cloudy drainage was elicited with the patient squeezing the area.  No palpable masses.  No cellulitis.  No axillary adenopathy.  Examination of the left breast demonstrates no discrete mass, skin change, or axillary adenopathy.       Procedures   Culture obtained of the fluid in the right breast  Results/Data:  Imaging:   Notes:   Lab:   Other:     Assessment/Plan    Recurrent right breast drainage/chronic fistula    Proceed with reexcision of the area.       Discussion/Summary: Clearly there is a small area of the fistula remaining as drainage is limited and resolves without antibiotics.  However it is clear that antibiotics alone will not resolve    Time " spent:     Patient's Body mass index is 36.7 kg/m². indicating that she is overweight (BMI 25-29.9). Obesity-related health conditions include the following: none. Obesity is improving with lifestyle modifications. BMI is is above average; BMI management plan is completed. We discussed portion control and increasing exercise..         No future appointments.      Please note that portions of this note were completed with a voice recognition program.    This document has been electronically signed by Mana CONNELL MD on July 30, 2021 14:42 EDT

## 2021-08-03 LAB
BACTERIA SPEC AEROBE CULT: ABNORMAL
BACTERIA SPEC AEROBE CULT: ABNORMAL
GRAM STN SPEC: ABNORMAL

## 2021-08-04 ENCOUNTER — TELEPHONE (OUTPATIENT)
Dept: SURGERY | Facility: CLINIC | Age: 36
End: 2021-08-04

## 2021-08-04 DIAGNOSIS — Z01.818 PREOP TESTING: Primary | ICD-10-CM

## 2021-08-04 RX ORDER — CEFDINIR 300 MG/1
300 CAPSULE ORAL 2 TIMES DAILY
Qty: 20 CAPSULE | Refills: 0 | Status: SHIPPED | OUTPATIENT
Start: 2021-08-04 | End: 2021-08-14

## 2021-08-06 ENCOUNTER — LAB (OUTPATIENT)
Dept: LAB | Facility: HOSPITAL | Age: 36
End: 2021-08-06

## 2021-08-06 ENCOUNTER — PRE-ADMISSION TESTING (OUTPATIENT)
Dept: PREADMISSION TESTING | Facility: HOSPITAL | Age: 36
End: 2021-08-06

## 2021-08-06 DIAGNOSIS — N61.1 BREAST ABSCESS: ICD-10-CM

## 2021-08-06 DIAGNOSIS — Z01.818 PREOP TESTING: ICD-10-CM

## 2021-08-06 LAB
ANION GAP SERPL CALCULATED.3IONS-SCNC: 13.3 MMOL/L (ref 5–15)
BASOPHILS # BLD AUTO: 0.08 10*3/MM3 (ref 0–0.2)
BASOPHILS NFR BLD AUTO: 0.6 % (ref 0–1.5)
BUN SERPL-MCNC: 10 MG/DL (ref 6–20)
BUN/CREAT SERPL: 14.3 (ref 7–25)
CALCIUM SPEC-SCNC: 9.2 MG/DL (ref 8.6–10.5)
CHLORIDE SERPL-SCNC: 102 MMOL/L (ref 98–107)
CO2 SERPL-SCNC: 20.7 MMOL/L (ref 22–29)
CREAT SERPL-MCNC: 0.7 MG/DL (ref 0.57–1)
DEPRECATED RDW RBC AUTO: 42.4 FL (ref 37–54)
EOSINOPHIL # BLD AUTO: 1.09 10*3/MM3 (ref 0–0.4)
EOSINOPHIL NFR BLD AUTO: 8.2 % (ref 0.3–6.2)
ERYTHROCYTE [DISTWIDTH] IN BLOOD BY AUTOMATED COUNT: 12.1 % (ref 12.3–15.4)
GFR SERPL CREATININE-BSD FRML MDRD: 94 ML/MIN/1.73
GLUCOSE SERPL-MCNC: 178 MG/DL (ref 65–99)
HCT VFR BLD AUTO: 42.9 % (ref 34–46.6)
HGB BLD-MCNC: 14.1 G/DL (ref 12–15.9)
IMM GRANULOCYTES # BLD AUTO: 0.05 10*3/MM3 (ref 0–0.05)
IMM GRANULOCYTES NFR BLD AUTO: 0.4 % (ref 0–0.5)
LYMPHOCYTES # BLD AUTO: 3.25 10*3/MM3 (ref 0.7–3.1)
LYMPHOCYTES NFR BLD AUTO: 24.3 % (ref 19.6–45.3)
MCH RBC QN AUTO: 31.4 PG (ref 26.6–33)
MCHC RBC AUTO-ENTMCNC: 32.9 G/DL (ref 31.5–35.7)
MCV RBC AUTO: 95.5 FL (ref 79–97)
MONOCYTES # BLD AUTO: 0.91 10*3/MM3 (ref 0.1–0.9)
MONOCYTES NFR BLD AUTO: 6.8 % (ref 5–12)
NEUTROPHILS NFR BLD AUTO: 59.7 % (ref 42.7–76)
NEUTROPHILS NFR BLD AUTO: 7.97 10*3/MM3 (ref 1.7–7)
NRBC BLD AUTO-RTO: 0 /100 WBC (ref 0–0.2)
PLATELET # BLD AUTO: 239 10*3/MM3 (ref 140–450)
PMV BLD AUTO: 10.8 FL (ref 6–12)
POTASSIUM SERPL-SCNC: 3.7 MMOL/L (ref 3.5–5.2)
RBC # BLD AUTO: 4.49 10*6/MM3 (ref 3.77–5.28)
SODIUM SERPL-SCNC: 136 MMOL/L (ref 136–145)
WBC # BLD AUTO: 13.35 10*3/MM3 (ref 3.4–10.8)

## 2021-08-06 PROCEDURE — 80048 BASIC METABOLIC PNL TOTAL CA: CPT

## 2021-08-06 PROCEDURE — 36415 COLL VENOUS BLD VENIPUNCTURE: CPT

## 2021-08-06 PROCEDURE — U0004 COV-19 TEST NON-CDC HGH THRU: HCPCS | Performed by: SURGERY

## 2021-08-06 PROCEDURE — 85025 COMPLETE CBC W/AUTO DIFF WBC: CPT

## 2021-08-06 PROCEDURE — C9803 HOPD COVID-19 SPEC COLLECT: HCPCS | Performed by: SURGERY

## 2021-08-06 NOTE — DISCHARGE INSTRUCTIONS
TAKE the following medications the morning of surgery:  8/10/2021  Arrival time per MD     All heart or blood pressure medications    Please discontinue all blood thinners and anticoagulants (except aspirin) prior to surgery as per your surgeon and cardiologist instructions.  Aspirin may be continued up to the day prior to surgery.    HOLD all diabetic medications the morning of surgery as order by physician.    Please follow instructions on use of prep cloths provided by nurse. Return instruction sheet to pre-op nurse on day of surgery.    A RESPONSIBLE PERSON MUST REMAIN IN THE WAITING ROOM DURING YOUR PROCEDURE AND A RESPONSIBLE  MUST BE AVAILABLE UPON YOUR DISCHARGE.    General Instructions:  8/9/2021  • Do NOT eat or drink after midnight which includes water, mints, or gum.  • You may brush your teeth. Dental appliances that are removable must be taken out day of surgery.  • Do NOT smoke, chew tobacco, or drink alcohol within 24 hours prior to surgery.  • Bring medications in original bottles, any inhalers and if applicable your C-PAP/BI-PAP machine  • Bring any papers given to you in the doctor’s office  • Wear clean, comfortable clothes and socks  • Do NOT wear contact lenses or make-up or dark nail polish.  Bring a case for your glasses if applicable.  • Bring crutches or walker if applicable  • Leave all other valuables and jewelry at home  • If you were given a blood bank armband, continue to wear it until discharged.    Preventing a Surgical Site Infection:  • Shower the night before surgery (unless instructed otherwise) using a fresh bar of anti-bacterial soap (such as Dial) and clean washcloth.  Dry with a clean towel and dress in clean clothing.  • For 2 to 3 days before surgery, avoid shaving with a razor near where you will have surgery because the razor can irritate skin and make it easier to develop an infection.  Ask your surgeon if you will be receiving antibiotics prior to surgery.  • Make  sure you, your family, and all healthcare providers clean their hands with soap and water or an alcohol-based hand  before caring for you or your wound.  • If at all possible, quit smoking as many days before surgery as you can.    Day of Surgery:  Upon arrival, a pre-op nurse and anesthesiologist will review your health history, obtain vital signs, and answer questions you may have.  The only belongings needed at this time will be your home medications and if applicable you C-PAP/BI-PAP machine.  If you are staying overnight, your family can leave the rest of your belongings in the car and bring them to your room later.  A pre-op nurse will start an IV and you may receive medication in preparation for surgery.  Due to patient privacy and limited space, only one member of your family will be able to accompany you in the pre-op area.  While you are in surgery your family should notify the waiting room  if they leave the waiting room area and provide a contact number.  Please be aware that surgery does come with discomfort.  We want to make every effort to control your discomfort so please discuss any uncontrolled symptoms with your nurse.  Your doctor will most likely have prescribed pain medications.  If you are going home after surgery you will receive individualized written care instructions before being discharged.  A responsible adult must drive you to and from the hospital on the day of surgery and stay with you for 24 hours.  If you are staying overnight following surgery, you will be transported to your hospital room following the recovery period.

## 2021-08-07 LAB — SARS-COV-2 RNA PNL SPEC NAA+PROBE: NOT DETECTED

## 2021-08-09 ENCOUNTER — TELEPHONE (OUTPATIENT)
Dept: SURGERY | Facility: CLINIC | Age: 36
End: 2021-08-09

## 2021-08-10 ENCOUNTER — ANESTHESIA (OUTPATIENT)
Dept: PERIOP | Facility: HOSPITAL | Age: 36
End: 2021-08-10

## 2021-08-10 ENCOUNTER — HOSPITAL ENCOUNTER (OUTPATIENT)
Facility: HOSPITAL | Age: 36
Setting detail: HOSPITAL OUTPATIENT SURGERY
Discharge: HOME | End: 2021-08-10
Attending: SURGERY | Admitting: SURGERY

## 2021-08-10 ENCOUNTER — ANESTHESIA EVENT (OUTPATIENT)
Dept: PERIOP | Facility: HOSPITAL | Age: 36
End: 2021-08-10

## 2021-08-10 VITALS
WEIGHT: 277.4 LBS | BODY MASS INDEX: 37.57 KG/M2 | HEIGHT: 72 IN | HEART RATE: 79 BPM | RESPIRATION RATE: 18 BRPM | TEMPERATURE: 97.9 F | SYSTOLIC BLOOD PRESSURE: 118 MMHG | DIASTOLIC BLOOD PRESSURE: 86 MMHG | OXYGEN SATURATION: 95 %

## 2021-08-10 DIAGNOSIS — N61.1 BREAST ABSCESS: ICD-10-CM

## 2021-08-10 LAB
B-HCG UR QL: NEGATIVE
GLUCOSE BLDC GLUCOMTR-MCNC: 160 MG/DL (ref 70–130)
INTERNAL NEGATIVE CONTROL: NEGATIVE
INTERNAL POSITIVE CONTROL: POSITIVE
Lab: NORMAL

## 2021-08-10 PROCEDURE — 19120 REMOVAL OF BREAST LESION: CPT | Performed by: SURGERY

## 2021-08-10 PROCEDURE — 82962 GLUCOSE BLOOD TEST: CPT

## 2021-08-10 PROCEDURE — 25010000002 FENTANYL CITRATE (PF) 50 MCG/ML SOLUTION: Performed by: NURSE ANESTHETIST, CERTIFIED REGISTERED

## 2021-08-10 PROCEDURE — 25010000002 MIDAZOLAM PER 1 MG: Performed by: NURSE ANESTHETIST, CERTIFIED REGISTERED

## 2021-08-10 PROCEDURE — 81025 URINE PREGNANCY TEST: CPT | Performed by: ANESTHESIOLOGY

## 2021-08-10 PROCEDURE — 25010000002 KETOROLAC TROMETHAMINE PER 15 MG: Performed by: NURSE ANESTHETIST, CERTIFIED REGISTERED

## 2021-08-10 PROCEDURE — 25010000002 PROPOFOL 10 MG/ML EMULSION: Performed by: NURSE ANESTHETIST, CERTIFIED REGISTERED

## 2021-08-10 PROCEDURE — 25010000002 ONDANSETRON PER 1 MG: Performed by: NURSE ANESTHETIST, CERTIFIED REGISTERED

## 2021-08-10 RX ORDER — DROPERIDOL 2.5 MG/ML
0.62 INJECTION, SOLUTION INTRAMUSCULAR; INTRAVENOUS ONCE AS NEEDED
Status: DISCONTINUED | OUTPATIENT
Start: 2021-08-10 | End: 2021-08-10 | Stop reason: HOSPADM

## 2021-08-10 RX ORDER — ONDANSETRON 2 MG/ML
INJECTION INTRAMUSCULAR; INTRAVENOUS AS NEEDED
Status: DISCONTINUED | OUTPATIENT
Start: 2021-08-10 | End: 2021-08-10 | Stop reason: SURG

## 2021-08-10 RX ORDER — SODIUM CHLORIDE 0.9 % (FLUSH) 0.9 %
10 SYRINGE (ML) INJECTION AS NEEDED
Status: DISCONTINUED | OUTPATIENT
Start: 2021-08-10 | End: 2021-08-10 | Stop reason: HOSPADM

## 2021-08-10 RX ORDER — MIDAZOLAM HYDROCHLORIDE 1 MG/ML
1 INJECTION INTRAMUSCULAR; INTRAVENOUS
Status: DISCONTINUED | OUTPATIENT
Start: 2021-08-10 | End: 2021-08-10 | Stop reason: HOSPADM

## 2021-08-10 RX ORDER — MAGNESIUM HYDROXIDE 1200 MG/15ML
LIQUID ORAL AS NEEDED
Status: DISCONTINUED | OUTPATIENT
Start: 2021-08-10 | End: 2021-08-10 | Stop reason: HOSPADM

## 2021-08-10 RX ORDER — SODIUM CHLORIDE, SODIUM LACTATE, POTASSIUM CHLORIDE, CALCIUM CHLORIDE 600; 310; 30; 20 MG/100ML; MG/100ML; MG/100ML; MG/100ML
100 INJECTION, SOLUTION INTRAVENOUS ONCE AS NEEDED
Status: DISCONTINUED | OUTPATIENT
Start: 2021-08-10 | End: 2021-08-10 | Stop reason: HOSPADM

## 2021-08-10 RX ORDER — GLIMEPIRIDE 1 MG/1
1 TABLET ORAL
COMMUNITY

## 2021-08-10 RX ORDER — BUPIVACAINE HYDROCHLORIDE AND EPINEPHRINE 5; 5 MG/ML; UG/ML
INJECTION, SOLUTION PERINEURAL AS NEEDED
Status: DISCONTINUED | OUTPATIENT
Start: 2021-08-10 | End: 2021-08-10 | Stop reason: HOSPADM

## 2021-08-10 RX ORDER — CLINDAMYCIN PHOSPHATE 900 MG/50ML
900 INJECTION INTRAVENOUS ONCE
Status: COMPLETED | OUTPATIENT
Start: 2021-08-10 | End: 2021-08-10

## 2021-08-10 RX ORDER — HYDROCODONE BITARTRATE AND ACETAMINOPHEN 7.5; 325 MG/1; MG/1
1 TABLET ORAL 4 TIMES DAILY PRN
Qty: 8 TABLET | Refills: 0 | Status: SHIPPED | OUTPATIENT
Start: 2021-08-10

## 2021-08-10 RX ORDER — FENTANYL CITRATE 50 UG/ML
INJECTION, SOLUTION INTRAMUSCULAR; INTRAVENOUS AS NEEDED
Status: DISCONTINUED | OUTPATIENT
Start: 2021-08-10 | End: 2021-08-10 | Stop reason: SURG

## 2021-08-10 RX ORDER — ONDANSETRON 2 MG/ML
4 INJECTION INTRAMUSCULAR; INTRAVENOUS AS NEEDED
Status: DISCONTINUED | OUTPATIENT
Start: 2021-08-10 | End: 2021-08-10 | Stop reason: HOSPADM

## 2021-08-10 RX ORDER — SODIUM CHLORIDE, SODIUM LACTATE, POTASSIUM CHLORIDE, CALCIUM CHLORIDE 600; 310; 30; 20 MG/100ML; MG/100ML; MG/100ML; MG/100ML
INJECTION, SOLUTION INTRAVENOUS CONTINUOUS PRN
Status: DISCONTINUED | OUTPATIENT
Start: 2021-08-10 | End: 2021-08-10 | Stop reason: SURG

## 2021-08-10 RX ORDER — FENTANYL CITRATE 50 UG/ML
50 INJECTION, SOLUTION INTRAMUSCULAR; INTRAVENOUS
Status: DISCONTINUED | OUTPATIENT
Start: 2021-08-10 | End: 2021-08-10 | Stop reason: HOSPADM

## 2021-08-10 RX ORDER — FAMOTIDINE 10 MG/ML
INJECTION, SOLUTION INTRAVENOUS AS NEEDED
Status: DISCONTINUED | OUTPATIENT
Start: 2021-08-10 | End: 2021-08-10 | Stop reason: SURG

## 2021-08-10 RX ORDER — CEFDINIR 300 MG/1
300 CAPSULE ORAL 2 TIMES DAILY
Qty: 14 CAPSULE | Refills: 0 | Status: SHIPPED | OUTPATIENT
Start: 2021-08-10 | End: 2021-08-17

## 2021-08-10 RX ORDER — LIDOCAINE HYDROCHLORIDE 20 MG/ML
INJECTION, SOLUTION INFILTRATION; PERINEURAL AS NEEDED
Status: DISCONTINUED | OUTPATIENT
Start: 2021-08-10 | End: 2021-08-10 | Stop reason: SURG

## 2021-08-10 RX ORDER — PROPOFOL 10 MG/ML
VIAL (ML) INTRAVENOUS AS NEEDED
Status: DISCONTINUED | OUTPATIENT
Start: 2021-08-10 | End: 2021-08-10 | Stop reason: SURG

## 2021-08-10 RX ORDER — MIDAZOLAM HYDROCHLORIDE 1 MG/ML
INJECTION INTRAMUSCULAR; INTRAVENOUS AS NEEDED
Status: DISCONTINUED | OUTPATIENT
Start: 2021-08-10 | End: 2021-08-10 | Stop reason: SURG

## 2021-08-10 RX ORDER — IPRATROPIUM BROMIDE AND ALBUTEROL SULFATE 2.5; .5 MG/3ML; MG/3ML
3 SOLUTION RESPIRATORY (INHALATION) ONCE AS NEEDED
Status: DISCONTINUED | OUTPATIENT
Start: 2021-08-10 | End: 2021-08-10 | Stop reason: HOSPADM

## 2021-08-10 RX ORDER — KETOROLAC TROMETHAMINE 30 MG/ML
30 INJECTION, SOLUTION INTRAMUSCULAR; INTRAVENOUS EVERY 6 HOURS PRN
Status: DISCONTINUED | OUTPATIENT
Start: 2021-08-10 | End: 2021-08-10 | Stop reason: HOSPADM

## 2021-08-10 RX ORDER — SODIUM CHLORIDE 0.9 % (FLUSH) 0.9 %
10 SYRINGE (ML) INJECTION EVERY 12 HOURS SCHEDULED
Status: DISCONTINUED | OUTPATIENT
Start: 2021-08-10 | End: 2021-08-10 | Stop reason: HOSPADM

## 2021-08-10 RX ORDER — KETOROLAC TROMETHAMINE 30 MG/ML
INJECTION, SOLUTION INTRAMUSCULAR; INTRAVENOUS AS NEEDED
Status: DISCONTINUED | OUTPATIENT
Start: 2021-08-10 | End: 2021-08-10 | Stop reason: SURG

## 2021-08-10 RX ORDER — SODIUM CHLORIDE, SODIUM LACTATE, POTASSIUM CHLORIDE, CALCIUM CHLORIDE 600; 310; 30; 20 MG/100ML; MG/100ML; MG/100ML; MG/100ML
125 INJECTION, SOLUTION INTRAVENOUS ONCE
Status: COMPLETED | OUTPATIENT
Start: 2021-08-10 | End: 2021-08-10

## 2021-08-10 RX ORDER — SODIUM CHLORIDE, SODIUM LACTATE, POTASSIUM CHLORIDE, CALCIUM CHLORIDE 600; 310; 30; 20 MG/100ML; MG/100ML; MG/100ML; MG/100ML
125 INJECTION, SOLUTION INTRAVENOUS ONCE
Status: DISCONTINUED | OUTPATIENT
Start: 2021-08-10 | End: 2021-08-10 | Stop reason: HOSPADM

## 2021-08-10 RX ORDER — MEPERIDINE HYDROCHLORIDE 25 MG/ML
12.5 INJECTION INTRAMUSCULAR; INTRAVENOUS; SUBCUTANEOUS
Status: DISCONTINUED | OUTPATIENT
Start: 2021-08-10 | End: 2021-08-10 | Stop reason: HOSPADM

## 2021-08-10 RX ORDER — OXYCODONE HYDROCHLORIDE AND ACETAMINOPHEN 5; 325 MG/1; MG/1
1 TABLET ORAL ONCE AS NEEDED
Status: DISCONTINUED | OUTPATIENT
Start: 2021-08-10 | End: 2021-08-10 | Stop reason: HOSPADM

## 2021-08-10 RX ADMIN — FAMOTIDINE 20 MG: 10 INJECTION INTRAVENOUS at 13:03

## 2021-08-10 RX ADMIN — LIDOCAINE HYDROCHLORIDE 40 MG: 20 INJECTION, SOLUTION INFILTRATION; PERINEURAL at 13:03

## 2021-08-10 RX ADMIN — KETOROLAC TROMETHAMINE 30 MG: 30 INJECTION, SOLUTION INTRAMUSCULAR at 13:16

## 2021-08-10 RX ADMIN — CLINDAMYCIN PHOSPHATE 900 MG: 900 INJECTION, SOLUTION INTRAVENOUS at 13:00

## 2021-08-10 RX ADMIN — SODIUM CHLORIDE, POTASSIUM CHLORIDE, SODIUM LACTATE AND CALCIUM CHLORIDE 125 ML/HR: 600; 310; 30; 20 INJECTION, SOLUTION INTRAVENOUS at 12:11

## 2021-08-10 RX ADMIN — FENTANYL CITRATE 100 MCG: 50 INJECTION INTRAMUSCULAR; INTRAVENOUS at 13:03

## 2021-08-10 RX ADMIN — ONDANSETRON 4 MG: 2 INJECTION INTRAMUSCULAR; INTRAVENOUS at 13:03

## 2021-08-10 RX ADMIN — SODIUM CHLORIDE, POTASSIUM CHLORIDE, SODIUM LACTATE AND CALCIUM CHLORIDE: 600; 310; 30; 20 INJECTION, SOLUTION INTRAVENOUS at 12:57

## 2021-08-10 RX ADMIN — MIDAZOLAM 2 MG: 1 INJECTION INTRAMUSCULAR; INTRAVENOUS at 12:59

## 2021-08-10 RX ADMIN — PROPOFOL 150 MG: 10 INJECTION, EMULSION INTRAVENOUS at 13:03

## 2021-08-10 RX ADMIN — FENTANYL CITRATE 50 MCG: 50 INJECTION INTRAMUSCULAR; INTRAVENOUS at 14:01

## 2021-08-10 RX ADMIN — FENTANYL CITRATE 50 MCG: 50 INJECTION INTRAMUSCULAR; INTRAVENOUS at 13:52

## 2021-08-10 NOTE — ANESTHESIA POSTPROCEDURE EVALUATION
Patient: Marybeth Drake    Procedure Summary     Date: 08/10/21 Room / Location: [facility] OR  /  [facility]    Anesthesia Start: 1257 Anesthesia Stop: 1335    Procedure: Excision Recurrent Chronic Right Breast Abscess (Right Breast) Diagnosis:       Breast abscess      (Breast abscess [N61.1])    Surgeons: Mana Clark MD Provider: Kd Godinez MD    Anesthesia Type: general ASA Status: 3          Anesthesia Type: general    Vitals  Vitals Value Taken Time   /81 08/10/21 1405   Temp 97.5 °F (36.4 °C) 08/10/21 1335   Pulse 76 08/10/21 1405   Resp 14 08/10/21 1405   SpO2 94 % 08/10/21 1405           Post Anesthesia Care and Evaluation    Patient location during evaluation: PACU  Patient participation: complete - patient participated  Level of consciousness: awake  Pain score: 3  Pain management: adequate  Airway patency: patent  Anesthetic complications: No anesthetic complications  PONV Status: none  Cardiovascular status: acceptable  Respiratory status: acceptable  Hydration status: acceptable

## 2021-08-10 NOTE — OP NOTE
Excision of recurrent right nipple abscess/fistula    Surgeon:  Mana Clark M.D., FDickADickCDickS.    Assistant: Kenneth    Pre-op:  recurrent right nipple abscess/fistula    Post-op:  Same    Anesthesia:  General    Indications: recurrent right nipple abscess/fistula.  Most recent wound culture grew strep intermedius.  Patient has been on Omnicef for a week prior to the procedure    Procedure Details   After obtaining informed consent and with venous compression boots in place and receiving preoperative antibiotics the patient was taken to the operating room and placed under anesthesia. The right breast was prepped and draped in a sterile fashion.  The patient had a pinpoint opening in the middle of her incision at 3:00.  This is where she has had the drainage from.  No drainage was expressible today.  A small lacrimal probe was passed from this incision medially towards the nipple and exited without resistance on the lateral side of the nipple there was a small amount of drainage associated with this confirming that this was not a false passage but the true fistula.  With the probe in place through the fistula an incision was made through the skin to the fistula.  This did involve making an incision through the inferior portion of the nipple the fistula tract was identified and excised and sent to pathology.  A curette was then used on the area in case there was any residual tract left behind.  The wound was irrigated and hemostasis was obtained.   The incision was closed with interrupted 4-0 Monocryl subcuticular sutures to allow for any residual drainage that might be present.  Dressing was placed patient tolerated the procedure well and was taken to the recovery room in stable condition    Findings:  Chronic fistula without abscess    Estimated Blood Loss:  Minimal    Blood administered:            Drains: none           Specimens:   ID Type Source Tests Collected by Time   A (Not marked as sent) : chronic right  breast abscess / fistula Tissue Breast, Right TISSUE PATHOLOGY EXAM Mana Clark MD 8/10/2021 1320        Grafts and Implants: None        Complications:  none           Disposition: PACU - hemodynamically stable.           Condition: stable

## 2021-08-10 NOTE — ANESTHESIA PREPROCEDURE EVALUATION
Anesthesia Evaluation     Patient summary reviewed and Nursing notes reviewed   no history of anesthetic complications:  NPO Solid Status: > 8 hours  NPO Liquid Status: > 8 hours           Airway   Mallampati: III  TM distance: >3 FB  Neck ROM: full  No difficulty expected  Dental - normal exam   (+) implants        Pulmonary - normal exam    breath sounds clear to auscultation  (+) a smoker Current Smoked day of surgery,   Cardiovascular - negative cardio ROS and normal exam    Rhythm: regular        Neuro/Psych  (+) seizures,     GI/Hepatic/Renal/Endo    (+) obesity, morbid obesity,  diabetes mellitus type 2 poorly controlled,     Musculoskeletal     Abdominal   (+) obese,    Substance History - negative use     OB/GYN negative ob/gyn ROS   (-)  Pregnant        Other   arthritis,          Phys Exam Other: Cap No. 9                    Anesthesia Plan    ASA 3     general     intravenous induction     Anesthetic plan, all risks, benefits, and alternatives have been provided, discussed and informed consent has been obtained with: patient.  Use of blood products discussed with patient  Consented to blood products.       Lab Results   Component Value Date    WBC 13.35 (H) 08/06/2021    HGB 14.1 08/06/2021    HCT 42.9 08/06/2021    MCV 95.5 08/06/2021     08/06/2021       Lab Results   Component Value Date    GLUCOSE 178 (H) 08/06/2021    BUN 10 08/06/2021    CREATININE 0.70 08/06/2021    EGFRIFNONA 94 08/06/2021    BCR 14.3 08/06/2021    K 3.7 08/06/2021    CO2 20.7 (L) 08/06/2021    CALCIUM 9.2 08/06/2021    ALBUMIN 4.3 08/13/2014    LABIL2 1.6 08/13/2014    AST 29 08/13/2014    ALT 53 (H) 08/13/2014

## 2021-08-10 NOTE — ANESTHESIA PROCEDURE NOTES
Airway  Urgency: elective    Date/Time: 8/10/2021 1:04 PM    General Information and Staff    Patient location during procedure: OR  CRNA: Naty Merrill CRNA    Indications and Patient Condition    Preoxygenated: yes  Mask difficulty assessment: 1 - vent by mask    Final Airway Details  Final airway type: supraglottic airway      Successful airway: unique  Size 4    Number of attempts at approach: 1  Assessment: lips, teeth, and gum same as pre-op and atraumatic intubation

## 2021-08-11 LAB — LAB AP CASE REPORT: NORMAL

## 2021-08-23 ENCOUNTER — OFFICE VISIT (OUTPATIENT)
Dept: SURGERY | Facility: CLINIC | Age: 36
End: 2021-08-23

## 2021-08-23 VITALS
DIASTOLIC BLOOD PRESSURE: 81 MMHG | HEART RATE: 83 BPM | HEIGHT: 72 IN | WEIGHT: 277 LBS | BODY MASS INDEX: 37.52 KG/M2 | SYSTOLIC BLOOD PRESSURE: 136 MMHG

## 2021-08-23 DIAGNOSIS — Z09 POSTOP CHECK: ICD-10-CM

## 2021-08-23 DIAGNOSIS — N61.1 BREAST ABSCESS: Primary | ICD-10-CM

## 2021-08-23 PROCEDURE — 99024 POSTOP FOLLOW-UP VISIT: CPT | Performed by: SURGERY

## 2021-08-23 NOTE — PROGRESS NOTES
"Subjective   Marybeth Drake is a 36 y.o. female here today for post op.    History of Present Illness  Ms. 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 It was felt that the entire tract had been excised. Patient presents to the office today with a 4-day history with a small amount of purulent drainage from the medial aspect of the incision. No fever or chills. No erythema.  Allergies   Allergen Reactions   • Phenobarbital Hallucinations         Current Outpatient Medications   Medication Sig Dispense Refill   • glimepiride (AMARYL) 1 MG tablet Take 1 mg by mouth Every Morning Before Breakfast.     • loratadine (CLARITIN) 10 MG tablet Take  by mouth.     • metFORMIN (GLUCOPHAGE) 850 MG tablet Take 1,000 mg by mouth 2 (two) times a day.     • Multiple Vitamins-Calcium (One-A-Day Womens Formula) tablet Take  by mouth Daily.     • SITagliptin (JANUVIA) 25 MG tablet Take 25 mg by mouth Daily.     • HYDROcodone-acetaminophen (Norco) 7.5-325 MG per tablet Take 1 tablet by mouth 4 (Four) Times a Day As Needed for Moderate Pain . 8 tablet 0     No current facility-administered medications for this visit.       Objective   /81 (BP Location: Left arm)   Pulse 83   Ht 185.4 cm (73\")   Wt 126 kg (277 lb)   BMI 36.55 kg/m²    Physical Exam  Right breast: On visual inspection there is a radial scar at the nipple and extending medially to the edge of the nipple areolar complex. A single drop of purulent fluid is identified as an opening at the medial aspect.  Results/Data      Procedures   Procedure Note    Procedure: Incision and drainage    Location: Right nipple areolar complex    Anesthesia: 1% lidocaine with epinephrine    Description:  The risks and benefits of the procedure were discussed. After prep with Betadine and " infiltration with lidocaine the area of the drainage was opened and an ellipse of tissue about 5 mm was excised. This did not appear to track anywhere. Wound was packed with quarter inch Nu Gauze and dry dressing    Complications:  none    Follow-up: 2 weeks  Assessment/Plan   Status post reexcision of a chronic nipple abscess with persistent drainage from the medial aspect of the incision    Local wound care with daily packing.  Follow-up in 2 weeks       Discussion/Summary  Patient's Body mass index is 36.55 kg/m². indicating that she is overweight (BMI 25-29.9). Obesity-related health conditions include the following: none. Obesity is improving with lifestyle modifications. BMI is is above average; BMI management plan is completed. We discussed portion control and increasing exercise..       No future appointments.      Please note that portions of this note were completed with a voice recognition program.

## 2021-08-25 ENCOUNTER — TELEPHONE (OUTPATIENT)
Dept: SURGERY | Facility: CLINIC | Age: 36
End: 2021-08-25

## 2021-09-03 RX ORDER — CEFDINIR 300 MG/1
300 CAPSULE ORAL 2 TIMES DAILY
Qty: 20 CAPSULE | Refills: 0 | Status: SHIPPED | OUTPATIENT
Start: 2021-09-03 | End: 2021-09-13

## 2021-09-10 ENCOUNTER — TELEPHONE (OUTPATIENT)
Dept: SURGERY | Facility: CLINIC | Age: 36
End: 2021-09-10

## 2021-09-13 ENCOUNTER — OFFICE VISIT (OUTPATIENT)
Dept: SURGERY | Facility: CLINIC | Age: 36
End: 2021-09-13

## 2021-09-13 VITALS
HEIGHT: 72 IN | DIASTOLIC BLOOD PRESSURE: 82 MMHG | HEART RATE: 93 BPM | SYSTOLIC BLOOD PRESSURE: 132 MMHG | BODY MASS INDEX: 37.41 KG/M2 | WEIGHT: 276.2 LBS

## 2021-09-13 DIAGNOSIS — N61.1 BREAST ABSCESS: Primary | ICD-10-CM

## 2021-09-13 DIAGNOSIS — Z51.89 VISIT FOR WOUND CHECK: ICD-10-CM

## 2021-09-13 PROCEDURE — 99024 POSTOP FOLLOW-UP VISIT: CPT | Performed by: SURGERY

## 2021-09-13 NOTE — PROGRESS NOTES
"Subjective   Marybeth Drake is a 36 y.o. female here today for follow up after second round of antibiotic.    History of Present Illness  Ms. 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 It was felt that the entire tract had been excised. Patient presents\ed to the office on 8/23/2021 with a 4-day history with a small amount of purulent drainage from the medial aspect of the incision.  It was opened in the office and patient began packing the wound.  She called the office last week and felt she had some additional infection and an additional prescription for Omnicef was called in.  She has 1 more days of antibiotics to complete.  She states there is a little soreness at the inferior aspect of the incision  Allergies   Allergen Reactions   • Phenobarbital Hallucinations         Current Outpatient Medications   Medication Sig Dispense Refill   • cefdinir (OMNICEF) 300 MG capsule Take 1 capsule by mouth 2 (Two) Times a Day for 10 days. 20 capsule 0   • glimepiride (AMARYL) 1 MG tablet Take 1 mg by mouth Every Morning Before Breakfast.     • loratadine (CLARITIN) 10 MG tablet Take  by mouth.     • metFORMIN (GLUCOPHAGE) 850 MG tablet Take 1,000 mg by mouth 2 (two) times a day.     • Multiple Vitamins-Calcium (One-A-Day Womens Formula) tablet Take  by mouth Daily.     • SITagliptin (JANUVIA) 25 MG tablet Take 25 mg by mouth Daily.     • HYDROcodone-acetaminophen (Norco) 7.5-325 MG per tablet Take 1 tablet by mouth 4 (Four) Times a Day As Needed for Moderate Pain . 8 tablet 0     No current facility-administered medications for this visit.       Objective   /82 (BP Location: Left arm)   Pulse 93   Ht 185.4 cm (73\")   Wt 125 kg (276 lb 3.2 oz)   BMI 36.44 kg/m²    Physical Exam  Right breast: The surgical transverse scar in the " area [place] from the nipple to the medial 3 o'clock position has healed totally.  I do not see any induration or drainage.  Results/Data      Procedures     Assessment/Plan   Status post reexcision of a chronic nipple abscess with persistent drainage from the medial aspect of the incision    Follow-up as needed       Discussion/Summary  Patient's Body mass index is 36.44 kg/m². indicating that she is overweight (BMI 25-29.9). Obesity-related health conditions include the following: none. Obesity is improving with lifestyle modifications. BMI is is above average; BMI management plan is completed. We discussed portion control and increasing exercise..       No future appointments.      Please note that portions of this note were completed with a voice recognition program.

## 2021-09-24 ENCOUNTER — TELEPHONE (OUTPATIENT)
Dept: SURGERY | Facility: CLINIC | Age: 36
End: 2021-09-24

## 2021-09-24 NOTE — TELEPHONE ENCOUNTER
Returned Marybeth's phone call with a message for Dr. Clark. Dr. Steen thinks the best thing she can do at this point is to excise the nipple. I told her she could come in and talk to the doctor. She said she'd think about it and maybe talk to her PCP also.

## 2022-01-05 ENCOUNTER — HOSPITAL ENCOUNTER (OUTPATIENT)
Dept: MAMMOGRAPHY | Facility: HOSPITAL | Age: 37
Discharge: HOME | End: 2022-01-05
Admitting: RADIOLOGY

## 2022-01-05 DIAGNOSIS — R92.8 ABNORMAL MAMMOGRAM: ICD-10-CM

## 2022-01-05 PROCEDURE — G0279 TOMOSYNTHESIS, MAMMO: HCPCS

## 2022-01-05 PROCEDURE — 77066 DX MAMMO INCL CAD BI: CPT

## 2022-01-05 PROCEDURE — 77062 BREAST TOMOSYNTHESIS BI: CPT | Performed by: RADIOLOGY

## 2022-01-05 PROCEDURE — 77066 DX MAMMO INCL CAD BI: CPT | Performed by: RADIOLOGY

## 2022-06-14 ENCOUNTER — HOSPITAL ENCOUNTER (OUTPATIENT)
Dept: MRI IMAGING | Facility: HOSPITAL | Age: 37
Discharge: HOME | End: 2022-06-14

## 2022-06-14 DIAGNOSIS — N64.51 INDURATION OF BREAST: ICD-10-CM

## 2022-06-14 LAB — CREAT BLDA-MCNC: 0.5 MG/DL (ref 0.6–1.3)

## 2022-06-14 PROCEDURE — 82565 ASSAY OF CREATININE: CPT

## 2023-01-12 ENCOUNTER — HOSPITAL ENCOUNTER (OUTPATIENT)
Dept: MAMMOGRAPHY | Facility: HOSPITAL | Age: 38
Discharge: HOME | End: 2023-01-12
Admitting: OBSTETRICS & GYNECOLOGY
Payer: COMMERCIAL

## 2023-01-12 ENCOUNTER — APPOINTMENT (OUTPATIENT)
Dept: MAMMOGRAPHY | Facility: HOSPITAL | Age: 38
End: 2023-01-12

## 2023-01-12 DIAGNOSIS — R92.8 ABNORMAL MAMMOGRAM: ICD-10-CM

## 2023-01-12 PROCEDURE — G0279 TOMOSYNTHESIS, MAMMO: HCPCS

## 2023-01-12 PROCEDURE — 77066 DX MAMMO INCL CAD BI: CPT | Performed by: RADIOLOGY

## 2023-01-12 PROCEDURE — 77062 BREAST TOMOSYNTHESIS BI: CPT | Performed by: RADIOLOGY

## 2023-01-12 PROCEDURE — 77066 DX MAMMO INCL CAD BI: CPT

## 2025-02-03 ENCOUNTER — OFFICE VISIT (OUTPATIENT)
Dept: ORTHOPEDIC SURGERY | Facility: CLINIC | Age: 40
End: 2025-02-03
Payer: COMMERCIAL

## 2025-02-03 ENCOUNTER — HOSPITAL ENCOUNTER (OUTPATIENT)
Dept: GENERAL RADIOLOGY | Facility: HOSPITAL | Age: 40
Discharge: HOME | End: 2025-02-03
Admitting: FAMILY MEDICINE
Payer: COMMERCIAL

## 2025-02-03 VITALS
OXYGEN SATURATION: 97 % | DIASTOLIC BLOOD PRESSURE: 82 MMHG | WEIGHT: 270.2 LBS | SYSTOLIC BLOOD PRESSURE: 118 MMHG | HEIGHT: 72 IN | BODY MASS INDEX: 36.6 KG/M2 | HEART RATE: 100 BPM

## 2025-02-03 DIAGNOSIS — M25.562 LEFT KNEE PAIN, UNSPECIFIED CHRONICITY: ICD-10-CM

## 2025-02-03 DIAGNOSIS — M25.562 ACUTE PAIN OF LEFT KNEE: Primary | ICD-10-CM

## 2025-02-03 DIAGNOSIS — S86.912A KNEE STRAIN, LEFT, INITIAL ENCOUNTER: ICD-10-CM

## 2025-02-03 PROCEDURE — 73562 X-RAY EXAM OF KNEE 3: CPT | Performed by: RADIOLOGY

## 2025-02-03 PROCEDURE — 73562 X-RAY EXAM OF KNEE 3: CPT

## 2025-02-03 RX ORDER — POLYMYXIN B SULFATE AND TRIMETHOPRIM 1; 10000 MG/ML; [USP'U]/ML
SOLUTION OPHTHALMIC
COMMUNITY
Start: 2025-01-30

## 2025-02-03 RX ORDER — LISINOPRIL 5 MG/1
TABLET ORAL
COMMUNITY
Start: 2025-01-31

## 2025-02-03 NOTE — PROGRESS NOTES
"New visit      Patient: Marybeth Drake  YOB: 1985  Date of Encounter: 02/03/2025  PCP: Daniela Steel CNM  Referring Provider: Self Referring     Subjective   Marybeth Drake is a 39 y.o. female who presents to the office today for evaluation of Initial Evaluation and Pain of the Left Knee      Chief Complaint   Patient presents with    Left Knee - Initial Evaluation, Pain       HPI  New patient who presents complaining of left knee pain that has been going on since mid November.  Denies any injury.  States that the knee did pop while she was walking upstairs.  States the pain comes and goes sometimes is severe and is significantly worse when on steps and is sometimes waking her up at night.  She has tried heat ice and is taking approximately 4 over-the-counter ibuprofens a day.  Pain is currently 2/10 but when flared up is 10/10  Patient Active Problem List   Diagnosis    Breast abscess     New  Past Medical History:   Diagnosis Date    Abscess     right breast    Alopecia areata     Arthritis     Diabetes mellitus     Dislocation of finger Dec 2000    Left pinky    Fracture of wrist     Right wrost baxk in elementary school    Missed ab     MRSA (methicillin resistant staph aureus) culture positive     Rotator cuff syndrome April 2011    Had to have surgery right shoulder    Seizures     Tennis elbow      said i had it back in 2009       Allergies   Allergen Reactions    Nickel Sulfate [Nickel] Hives    Phenobarbital Hallucinations       Vitals:   /82 (BP Location: Left arm, Patient Position: Sitting, Cuff Size: Adult)   Pulse 100   Ht 185.4 cm (72.99\")   Wt 123 kg (270 lb 3.2 oz)   SpO2 97%   BMI 35.66 kg/m²          Review of Systems   Constitutional:  Positive for activity change. Negative for fever.   Respiratory:  Negative for shortness of breath and wheezing.    Cardiovascular:  Negative for chest pain.   Musculoskeletal:  Positive for arthralgias, gait problem, joint swelling and " "myalgias.   Skin:  Negative for color change and wound.   Neurological:  Negative for weakness and numbness.       Visit Vitals  /82 (BP Location: Left arm, Patient Position: Sitting, Cuff Size: Adult)   Pulse 100   Ht 185.4 cm (72.99\")   Wt 123 kg (270 lb 3.2 oz)   SpO2 97%   BMI 35.66 kg/m²     39 y.o.female  Physical Exam  Vitals and nursing note reviewed.   Constitutional:       General: She is not in acute distress.     Appearance: Normal appearance.   Pulmonary:      Effort: Pulmonary effort is normal. No respiratory distress.   Musculoskeletal:      Left knee: No effusion or crepitus. Normal range of motion. Tenderness present over the medial joint line and lateral joint line. No LCL laxity, MCL laxity, ACL laxity or PCL laxity.Normal pulse.      Instability Tests: Anterior drawer test negative. Posterior drawer test negative. Anterior Lachman test negative. Medial William test positive. Lateral William test negative.      Comments: Full range of motion.  Intact strength with pain on resisted knee extension and hip flexion.   Skin:     General: Skin is warm and dry.      Findings: No erythema.   Neurological:      General: No focal deficit present.      Mental Status: She is alert.      Sensory: No sensory deficit.      Motor: No weakness.       Radiology Results:    XR Knee 3 View Left    Result Date: 2/3/2025    No acute osseous or articular abnormality.  This report was finalized on 2/3/2025 11:52 AM by Dr. Je Canada MD.       Assessment & Plan   Diagnoses and all orders for this visit:    1. Acute pain of left knee (Primary)  -     XR Knee 3 View Left; Future    2. Knee strain, left, initial encounter         MEDS ORDERED DURING VISIT:  No orders of the defined types were placed in this encounter.    MEDICATION ISSUES:  Discussed medication options and treatment plan of prescribed medication as well as the risks, benefits, and side effects including potential falls, possible impaired driving " and metabolic adversities among others. Patient is agreeable to call the office with any worsening of symptoms or onset of side effects. Patient is agreeable to call 911 or go to the nearest ER should he/she begin having SI/HI.     Discussion:  Reviewed x-ray with patient which is WNL.  This suggests a likely soft tissue injury.  I am suspicious of the medial meniscus.  Patient can continue taking her ibuprofen and I have given her home exercise plan to work on for the knee.  Will reevaluate in 1 month and strongly consider medication changes and PT if not improving.             This document has been electronically signed by Tc Diaz DO   February 3, 2025 11:42 EST    Dictated Utilizing Dragon Dictation: Part of this note may be an electronic transcription/translation of spoken language to printed text using the Dragon Dictation System.

## 2025-02-07 ENCOUNTER — HOSPITAL ENCOUNTER (OUTPATIENT)
Dept: MAMMOGRAPHY | Facility: HOSPITAL | Age: 40
Discharge: HOME | End: 2025-02-07
Admitting: ADVANCED PRACTICE MIDWIFE
Payer: COMMERCIAL

## 2025-02-07 ENCOUNTER — PATIENT ROUNDING (BHMG ONLY) (OUTPATIENT)
Dept: ORTHOPEDIC SURGERY | Facility: CLINIC | Age: 40
End: 2025-02-07
Payer: COMMERCIAL

## 2025-02-07 DIAGNOSIS — Z12.31 VISIT FOR SCREENING MAMMOGRAM: ICD-10-CM

## 2025-02-07 PROCEDURE — 77067 SCR MAMMO BI INCL CAD: CPT

## 2025-02-07 PROCEDURE — 77067 SCR MAMMO BI INCL CAD: CPT | Performed by: RADIOLOGY

## 2025-02-07 PROCEDURE — 77063 BREAST TOMOSYNTHESIS BI: CPT | Performed by: RADIOLOGY

## 2025-02-07 PROCEDURE — 77063 BREAST TOMOSYNTHESIS BI: CPT

## 2025-02-07 NOTE — PROGRESS NOTES
February 7, 2025    Hello, may I speak with Marybeth Drake?    My name is SCOTTY      I am  with [facility]  [address]  [place] [address] [phone].    Before we get started may I verify your date of birth? 1985    I am calling to officially welcome you to our practice and ask about your recent visit. Is this a good time to talk? yes    Tell me about your visit with us. What things went well?  ALL WAS GREAT, NO COMPLAINTS       We're always looking for ways to make our patients' experiences even better. Do you have recommendations on ways we may improve?  no    Overall were you satisfied with your first visit to our practice? yes       I appreciate you taking the time to speak with me today. Is there anything else I can do for you? no      Thank you, and have a great day.

## (undated) DEVICE — SUT VIC 3/0 SH 27IN J416H

## (undated) DEVICE — ELECTRD NDL MEGADYNE EZCLEAN NOSE 7CM

## (undated) DEVICE — PATIENT RETURN ELECTRODE, SINGLE-USE, CONTACT QUALITY MONITORING, ADULT, WITH 9FT CORD, FOR PATIENTS WEIGING OVER 33LBS. (15KG): Brand: MEGADYNE

## (undated) DEVICE — SUT SILK 2/0 SH 30IN K833H

## (undated) DEVICE — HOLDER: Brand: DEROYAL

## (undated) DEVICE — DRN PENRS RO SILCNE 1/4X12IN LF STRL

## (undated) DEVICE — PK BASIC 70

## (undated) DEVICE — GLV SURG PREMIERPRO MIC LTX PF SZ7 BRN

## (undated) DEVICE — APPL CHLORAPREP HI/LITE 26ML ORNG

## (undated) DEVICE — AMD ANTIMICROBIAL NON-ADHERENT ISLAND DRESSING,0.2% POLYHEXAMETHYLENE BIGUANIDE HCI (PHMB): Brand: TELFA

## (undated) DEVICE — DRSNG PAD ABD 8X10IN STRL

## (undated) DEVICE — ANTIBACTERIAL UNDYED BRAIDED (POLYGLACTIN 910), SYNTHETIC ABSORBABLE SUTURE: Brand: COATED VICRYL

## (undated) DEVICE — SUT VIC 0/0 CT1 27IN J260H

## (undated) DEVICE — KT INK TISS MARGINMARKER STD 6COLOR

## (undated) DEVICE — DRAPE,T,LAPARO,TRANS,STERILE: Brand: MEDLINE

## (undated) DEVICE — UNDYED BRAIDED (POLYGLACTIN 910), SYNTHETIC ABSORBABLE SUTURE: Brand: COATED VICRYL

## (undated) DEVICE — DRAPE,UTILTY,TAPE,15X26, 4EA/PK: Brand: MEDLINE

## (undated) DEVICE — ELECTRD NDL EZ CLN MOD 2.75IN

## (undated) DEVICE — DBD-DRAPE,LAP,CHOLE,W/TROUGHS,STERILE: Brand: MEDLINE

## (undated) DEVICE — SUT VIC 4/0 P3 18IN J494G

## (undated) DEVICE — PENCL ES MEGADINE EZ/CLEAN BUTN W/HOLSTR 10FT

## (undated) DEVICE — BRA COMPR CURAD P/OP LG